# Patient Record
Sex: FEMALE | Race: WHITE
[De-identification: names, ages, dates, MRNs, and addresses within clinical notes are randomized per-mention and may not be internally consistent; named-entity substitution may affect disease eponyms.]

---

## 2018-02-17 ENCOUNTER — HOSPITAL ENCOUNTER (EMERGENCY)
Dept: HOSPITAL 62 - ER | Age: 83
Discharge: ANOTHER HOSPITAL | End: 2018-02-17
Payer: MEDICARE

## 2018-02-17 VITALS — SYSTOLIC BLOOD PRESSURE: 146 MMHG | DIASTOLIC BLOOD PRESSURE: 59 MMHG

## 2018-02-17 DIAGNOSIS — I25.10: ICD-10-CM

## 2018-02-17 DIAGNOSIS — I50.9: ICD-10-CM

## 2018-02-17 DIAGNOSIS — I11.0: ICD-10-CM

## 2018-02-17 DIAGNOSIS — I21.4: Primary | ICD-10-CM

## 2018-02-17 DIAGNOSIS — M54.2: ICD-10-CM

## 2018-02-17 DIAGNOSIS — E78.5: ICD-10-CM

## 2018-02-17 DIAGNOSIS — E78.00: ICD-10-CM

## 2018-02-17 DIAGNOSIS — I25.2: ICD-10-CM

## 2018-02-17 DIAGNOSIS — R07.9: ICD-10-CM

## 2018-02-17 DIAGNOSIS — I48.91: ICD-10-CM

## 2018-02-17 LAB
ADD MANUAL DIFF: NO
ALBUMIN SERPL-MCNC: 4.6 G/DL (ref 3.5–5)
ALP SERPL-CCNC: 48 U/L (ref 38–126)
ALT SERPL-CCNC: 23 U/L (ref 9–52)
ANION GAP SERPL CALC-SCNC: 14 MMOL/L (ref 5–19)
APPEARANCE UR: (no result)
APTT BLD: 23 SEC (ref 23.5–35.8)
APTT PPP: YELLOW S
AST SERPL-CCNC: 21 U/L (ref 14–36)
BASOPHILS # BLD AUTO: 0 10^3/UL (ref 0–0.2)
BASOPHILS NFR BLD AUTO: 0.8 % (ref 0–2)
BILIRUB DIRECT SERPL-MCNC: 0.2 MG/DL (ref 0–0.4)
BILIRUB SERPL-MCNC: 0.6 MG/DL (ref 0.2–1.3)
BILIRUB UR QL STRIP: NEGATIVE
BUN SERPL-MCNC: 12 MG/DL (ref 7–20)
CALCIUM: 10.8 MG/DL (ref 8.4–10.2)
CHLORIDE SERPL-SCNC: 96 MMOL/L (ref 98–107)
CK MB SERPL-MCNC: 1.15 NG/ML (ref ?–4.55)
CK SERPL-CCNC: 50 U/L (ref 30–135)
CO2 SERPL-SCNC: 31 MMOL/L (ref 22–30)
EOSINOPHIL # BLD AUTO: 0.3 10^3/UL (ref 0–0.6)
EOSINOPHIL NFR BLD AUTO: 5.2 % (ref 0–6)
ERYTHROCYTE [DISTWIDTH] IN BLOOD BY AUTOMATED COUNT: 13.4 % (ref 11.5–14)
GLUCOSE SERPL-MCNC: 97 MG/DL (ref 75–110)
GLUCOSE UR STRIP-MCNC: NEGATIVE MG/DL
HCT VFR BLD CALC: 37.8 % (ref 36–47)
HGB BLD-MCNC: 13.1 G/DL (ref 12–15.5)
INR PPP: 0.95
KETONES UR STRIP-MCNC: NEGATIVE MG/DL
LIPASE SERPL-CCNC: 18.4 U/L (ref 23–300)
LYMPHOCYTES # BLD AUTO: 1 10^3/UL (ref 0.5–4.7)
LYMPHOCYTES NFR BLD AUTO: 19.3 % (ref 13–45)
MCH RBC QN AUTO: 29.9 PG (ref 27–33.4)
MCHC RBC AUTO-ENTMCNC: 34.7 G/DL (ref 32–36)
MCV RBC AUTO: 86 FL (ref 80–97)
MONOCYTES # BLD AUTO: 0.7 10^3/UL (ref 0.1–1.4)
MONOCYTES NFR BLD AUTO: 12.6 % (ref 3–13)
NEUTROPHILS # BLD AUTO: 3.4 10^3/UL (ref 1.7–8.2)
NEUTS SEG NFR BLD AUTO: 62.1 % (ref 42–78)
NITRITE UR QL STRIP: NEGATIVE
PH UR STRIP: 7 [PH] (ref 5–9)
PLATELET # BLD: 266 10^3/UL (ref 150–450)
POTASSIUM SERPL-SCNC: 4.2 MMOL/L (ref 3.6–5)
PROT SERPL-MCNC: 6.8 G/DL (ref 6.3–8.2)
PROT UR STRIP-MCNC: NEGATIVE MG/DL
PROTHROMBIN TIME: 13.4 SEC (ref 11.4–15.4)
RBC # BLD AUTO: 4.37 10^6/UL (ref 3.72–5.28)
SODIUM SERPL-SCNC: 141.4 MMOL/L (ref 137–145)
SP GR UR STRIP: 1.01
TOTAL CELLS COUNTED % (AUTO): 100 %
TROPONIN I SERPL-MCNC: 0.24 NG/ML
UROBILINOGEN UR-MCNC: NEGATIVE MG/DL (ref ?–2)
WBC # BLD AUTO: 5.4 10^3/UL (ref 4–10.5)

## 2018-02-17 PROCEDURE — 85730 THROMBOPLASTIN TIME PARTIAL: CPT

## 2018-02-17 PROCEDURE — 71046 X-RAY EXAM CHEST 2 VIEWS: CPT

## 2018-02-17 PROCEDURE — 82553 CREATINE MB FRACTION: CPT

## 2018-02-17 PROCEDURE — 93005 ELECTROCARDIOGRAM TRACING: CPT

## 2018-02-17 PROCEDURE — 83690 ASSAY OF LIPASE: CPT

## 2018-02-17 PROCEDURE — 36415 COLL VENOUS BLD VENIPUNCTURE: CPT

## 2018-02-17 PROCEDURE — 81001 URINALYSIS AUTO W/SCOPE: CPT

## 2018-02-17 PROCEDURE — 85610 PROTHROMBIN TIME: CPT

## 2018-02-17 PROCEDURE — 93010 ELECTROCARDIOGRAM REPORT: CPT

## 2018-02-17 PROCEDURE — 82550 ASSAY OF CK (CPK): CPT

## 2018-02-17 PROCEDURE — 80053 COMPREHEN METABOLIC PANEL: CPT

## 2018-02-17 PROCEDURE — 99291 CRITICAL CARE FIRST HOUR: CPT

## 2018-02-17 PROCEDURE — 84484 ASSAY OF TROPONIN QUANT: CPT

## 2018-02-17 PROCEDURE — 85025 COMPLETE CBC W/AUTO DIFF WBC: CPT

## 2018-02-17 NOTE — RADIOLOGY REPORT (SQ)
EXAM DESCRIPTION:  CHEST PA/LAT



COMPLETED DATE/TIME:  2/17/2018 2:59 pm



REASON FOR STUDY:  CHEST PAIN



COMPARISON:  7/16/2014



NUMBER OF VIEWS:  Two view.



TECHNIQUE:  Frontal and lateral radiographic views of the chest acquired.



LIMITATIONS:  None.



FINDINGS:  LUNGS AND PLEURA: No opacities, masses or pneumothorax. No pleural effusion.

MEDIASTINUM AND HILAR STRUCTURES: No masses.  No contour abnormalities.

HEART AND VASCULAR STRUCTURES: Heart enlarged without failure.  Aorta normal for age.

BONES: No acute findings.

HARDWARE: None in the chest.

OTHER: No other significant finding.



IMPRESSION:  CARDIAC ENLARGEMENT WITHOUT FAILURE.



TECHNICAL DOCUMENTATION:  JOB ID:  [id number]

 2011 [email or web address]- All Rights Reserved

## 2018-02-17 NOTE — ER DOCUMENT REPORT
ED Medical Screen (RME)





- General


Chief Complaint: Chest Pain


Stated Complaint: CHEST PAIN


Time Seen by Provider: 02/17/18 14:21


Mode of Arrival: Ambulatory


Information source: Patient


TRAVEL OUTSIDE OF THE [place] IN LAST 30 DAYS: No





- HPI


Onset: Yesterday


Onset/Duration: Intermittent


Context: 





Patient and daughter describe intense pains that seem to occur randomly, began 

in the interscapular area (more on the left side) and migrate within several 

minutes time to the epigastric area.  The pain is so severe as to be 

incapacitating.  It resolves spontaneously within an hour or so.  It does not 

seem to be correlated with meals.  Patient does have known problems with acid 

reflux and an aortic aneurysm, for which no surgery is planned.  She is taking 

H2 blockers and proton pump inhibitors.  There have been approximately 10 

episodes of this same pain since yesterday.


Quality of pain: Dull, Pressure


Severity: Severe


Associated Symptoms: Nausea, Shortness of breath.  denies: Chills, Fever, 

Vomiting


Exacerbated by: Denies


Relieved by: Denies


Similar symptoms previously: No


Recently seen / treated by doctor: No





- Related Data


Smoking: Non-smoker


Frequency of alcohol use: None


Drug Abuse: None


Allergies/Adverse Reactions: 


 





No Known Allergies Allergy (Verified 02/17/18 11:55)


 











Past Medical History





- General


Information source: Patient





- Social History


Cigarette use (# per day): No


Chew tobacco use (# tins/day): No


Frequency of alcohol use: None


Drug Abuse: None


Lives with: Family


Family history: None





- Past Medical History


Cardiac Medical History: Reports: Hx Atrial Fibrillation, Hx Congestive Heart 

Failure, Hx Coronary Artery Disease, Hx Heart Attack, Hx Hypercholesterolemia, 

Hx Hypertension, Hx Peripheral Vascular Disease


Pulmonary Medical History: Reports: Hx Asthma, Hx Pneumonia


   Denies: Hx Tuberculosis


Neurological Medical History: Denies: Hx Seizures


GI Medical History: Reports: Hx Irritable Bowel, Hx Ulcer


Musculoskeltal Medical History: Reports Hx Arthritis


Past Surgical History: Reports: Hx Bowel Surgery, Hx Cardiac Catheterization - 

3 stent, Hx Coronary Stent, Hx Tonsillectomy.  Denies: Hx Pacemaker





- Immunizations


Hx Diphtheria, Pertussis, Tetanus Vaccination: Yes





Review of Systems





- Review of Systems


Constitutional: Weakness.  denies: Chills, Fever


EENT: No symptoms reported


Cardiovascular: See HPI


Respiratory: Short of breath


Gastrointestinal: See HPI.  denies: Diarrhea, Constipation, Poor appetite, 

Rectal bleeding


Genitourinary: No symptoms reported


Female Genitourinary: Post menopausal


Musculoskeletal: See HPI


Skin: No symptoms reported


Neurological/Psychological: No symptoms reported





Physical Exam





- Vital signs


Vitals: 





 











Temp Pulse Resp BP Pulse Ox


 


 98.4 F   99   16   142/54 H  92 


 


 02/17/18 12:09  02/17/18 12:09  02/17/18 12:09  02/17/18 12:09  02/17/18 12:09











Interpretation: Hypertensive.  No: Tachycardic, Tachypneic, Febrile





- HEENT


Head: Normocephalic


Eyes: Normal


Conjunctiva: Normal


Ears: Normal


External canal: Normal


Nasal: Normal


Mouth/Lips: Normal


Mucous membranes: Normal


Neck: Normal





- Respiratory


Respiratory status: No respiratory distress


Chest status: Nontender


Breath sounds: Normal





- Cardiovascular


Rhythm: Irregularly irregular


Heart sounds: Normal auscultation


Murmur: No





- Abdominal


Inspection: Normal


Distension: No distension


Bowel sounds: Normal





- Back


Back: Normal, Nontender.  No: CVA tenderness





- Extremities


General upper extremity: Normal inspection


General lower extremity: Normal inspection.  No: Edema





- Neurological


Neuro grossly intact: Yes


Cognition: Normal


Orientation: AAOx4





- Psychological


Associated symptoms: Normal affect, Normal mood





- Skin


Skin Temperature: Warm


Skin Moisture: Dry


Skin Color: Normal


Skin Turgor: Elastic





Course





- Vital Signs


Vital signs: 





 











Temp Pulse Resp BP Pulse Ox


 


 98.4 F   99   16   142/54 H  92 


 


 02/17/18 12:09  02/17/18 12:09  02/17/18 12:09  02/17/18 12:09  02/17/18 12:09

## 2018-02-17 NOTE — EKG REPORT
SEVERITY:- ABNORMAL ECG -

SINUS RHYTHM

VENTRICULAR PREMATURE COMPLEX

INCOMPLETE LEFT BUNDLE BRANCH BLOCK

PROBABLE LVH WITH SECONDARY REPOL ABNRM

:

Confirmed by: Latisha Jones 17-Feb-2018 22:08:15

## 2018-02-17 NOTE — ER DOCUMENT REPORT
ED Cardiac





- General


Chief Complaint: Chest Pain


Stated Complaint: CHEST PAIN


Time Seen by Provider: 02/17/18 14:21


Mode of Arrival: Stretcher


Information source: Patient, Relative


TRAVEL OUTSIDE OF THE [place] IN LAST 30 DAYS: No





- HPI


Patient complains to provider of: Chest pain


Notes: 





84-year-old lady with past medical history of hypertension, hyperlipidemia, AAA 

who presented today to emergency department for evaluation of chest pain.  She 

reported that she has been having chest pain for the past 2 days.  Chest pain 

is localized to the middle the chest, described as sharp as well as squeezing, 

radiation of pain to the left shoulder as well as neck, severity of symptoms is 

6 out of 10.  Pain is episodic.





- Related Data


Allergies/Adverse Reactions: 


 





No Known Allergies Allergy (Verified 02/17/18 11:55)


 











Past Medical History





- General


Information source: Patient





- Social History


Smoking Status: Former Smoker


Cigarette use (# per day): No


Chew tobacco use (# tins/day): No


Frequency of alcohol use: None


Drug Abuse: None


Lives with: Family


Family History: Reviewed & Not Pertinent - She has been


Patient has suicidal ideation: No


Patient has homicidal ideation: No





- Past Medical History


Cardiac Medical History: Reports: Hx Atrial Fibrillation, Hx Congestive Heart 

Failure, Hx Coronary Artery Disease, Hx Heart Attack, Hx Hypercholesterolemia, 

Hx Hypertension, Hx Peripheral Vascular Disease


Pulmonary Medical History: Reports: Hx Asthma, Hx Pneumonia


   Denies: Hx Tuberculosis


Neurological Medical History: Denies: Hx Seizures


Renal/ Medical History: Denies: Hx Peritoneal Dialysis


GI Medical History: Reports: Hx Irritable Bowel, Hx Ulcer


Musculoskeltal Medical History: Reports Hx Arthritis


Past Surgical History: Reports: Hx Bowel Surgery, Hx Cardiac Catheterization - 

3 stent, Hx Coronary Stent, Hx Tonsillectomy.  Denies: Hx Pacemaker





- Immunizations


Hx Diphtheria, Pertussis, Tetanus Vaccination: Yes


Hx Pneumococcal Vaccination: 09/30/12





Review of Systems





- Review of Systems


Notes: 





REVIEW OF SYSTEMS:


 CONSTITUTIONAL: -fevers, -chills


 EENT: -eye pain, -difficulty swallowing, -nasal congestion


 CARDIOVASCULAR: +chest pain, -syncope.


 RESPIRATORY: -cough, -SOB


 GASTROINTESTINAL: -abdominal pain, -nausea, -vomiting, -diarrhea, + indigestion


 GENITOURINARY: -dysuria, -hematuria


 MUSCULOSKELETAL: -back pain, -neck pain


 SKIN: -rash or skin lesions.


 HEMATOLOGIC: -easy bruising or bleeding.


 LYMPHATIC: -swollen, enlarged glands.


 NEUROLOGICAL: -altered mental status or loss of consciousness, -headache, -

neurologic symptoms


 PSYCHIATRIC: -anxiety, -depression.


 ALL OTHER SYSTEMS REVIEWED AND NEGATIVE.





Physical Exam





- Vital signs


Vitals: 


 











Temp Pulse Resp BP Pulse Ox


 


 98.4 F   99   16   142/54 H  92 


 


 02/17/18 12:09  02/17/18 12:09  02/17/18 12:09  02/17/18 12:09  02/17/18 12:09














- Notes


Notes: 





Reviewed vital signs and nursing note as charted by RN. 


CONSTITUTIONAL: Alert and oriented and responds appropriately to questions


HEAD: Normocephalic; atraumatic


EYES: PERRL; Conjunctivae clear, sclerae non-icteric


ENT: normal nose; no rhinorrhea; moist mucous membranes; pharynx without 

lesions noted


NECK: Supple without meningismus; non-tender; no cervical lymphadenopathy, no 

masses


CARD: Regular rate and rhythm; no murmurs, no clicks, no rubs, no gallops; 

symmetric distal pulses


RESP: Normal chest excursion without splinting or tachypnea; breath sounds 

clear and equal bilaterally


ABD/GI: Normal bowel sounds; non-distended; soft,


BACK:  The back appears normal and is non-tender to palpation


EXT: Normal ROM in all joints; non-tender to palpation; no cyanosis, no 

effusions, no edema   


SKIN: Normal color for age and race; warm; dry; good turgor; capillary refill < 

2 seconds; no acute lesions noted


NEURO: .Cranial nerves 3-12 intact. Motor strength 5/5 bilaterally. Sensation 

intact to touch bilaterally. No pronator drift. Finger to nose intact 

bilaterally


PSYCH: The patient's mood and manner are appropriate. Grooming and personal 

hygiene are appropriate.








Course





- Re-evaluation


Re-evalutation: 





02/17/18 18:30


84-year-old lady with past medical history of hypertension now with chest pain 

here


Differential diagnoses includes ACS, pneumonia, pleural effusion, GERD, 

muscular skeletal pain


Will obtain basic lab work including CBC, BMP


Chest x-ray, EKG, troponin


Continuous cardiac monitoring as well as pulse oximetry








Reassessment 7 PM


Patient has new ST depressions in her lateral leads compared to her prior EKG


Patient has elevated troponin, suspicion for NSTEMI


Primary Cardiologist is Dr. Reis, who does not have privileges at this hospital


I have discussed the case with transfer center at [facility], agree with 

transfer, will await callback from hospitalist


Will give patient full dose aspirin as well as nitroglycerin paste 


reassess








02/17/18 20:12


Spoke with [facility] transfer center,  will attempt to transfer patient divided


I have also discussed the case with Dr. Villegas from [facility], patient is 

on waiting list at present time, hospitals over the capacity





02/17/18 20:42


I was able to speak with [facility]


Patient is accepted under Dr. Juno Pressley


Agree with IV heparin bolus as well as continuous drip


Transfer patient to [facility]


Family updated, agree with admission and transfer


Discussed results of lab work today as well as workup, agree with transfer, 

discussed risks and benefits of transfer as well


Her pain is improving after nitroglycerin paste as well as full dose aspirin





- Vital Signs


Vital signs: 


 











Temp Pulse Resp BP Pulse Ox


 


 97.8 F   99   18   133/53 H  98 


 


 02/17/18 18:02  02/17/18 12:09  02/17/18 17:31  02/17/18 17:31  02/17/18 17:31














- Laboratory


Result Diagrams: 


 02/17/18 15:11





 02/17/18 15:11


Laboratory results interpreted by me: 


 











  02/17/18





  15:11


 


Chloride  96 L


 


Carbon Dioxide  31 H


 


Est GFR (Non-Af Amer)  58 L


 


Calcium  10.8 H


 


Lipase  18.4 L














Critical Care Note





- Critical Care Note


Total time excluding time spent on procedures (mins): 60


Comments: 





Critical Care Time: 60 minutes


Critical care provider statement: 


Critical care time was exclusive of:  Separately billable procedures and 

treating other patients and teaching time


Critical care was time spent personally by me on the following activities:  

Blood draw for specimens, development of treatment plan with patient or 

surrogate, evaluation of patient's response to treatment, examination of patient

, obtaining history from patient or surrogate, ordering and performing 

treatments and interventions, ordering and review of laboratory studies, 

ordering and review of radiographic studies, pulse oximetry, re-evaluation of 

patient's condition and review of old charts





I assumed direction of critical care for this patient from another provider in 

my specialty: no





Discharge





- Discharge


Referrals: 


MONIKA DAMON MD [Primary Care Provider] - Follow up as needed

## 2018-10-14 ENCOUNTER — HOSPITAL ENCOUNTER (INPATIENT)
Dept: HOSPITAL 62 - ER | Age: 83
LOS: 6 days | Discharge: HOSPICE | DRG: 207 | End: 2018-10-20
Attending: STUDENT IN AN ORGANIZED HEALTH CARE EDUCATION/TRAINING PROGRAM | Admitting: STUDENT IN AN ORGANIZED HEALTH CARE EDUCATION/TRAINING PROGRAM
Payer: MEDICARE

## 2018-10-14 DIAGNOSIS — I42.9: ICD-10-CM

## 2018-10-14 DIAGNOSIS — Z66: ICD-10-CM

## 2018-10-14 DIAGNOSIS — J13: ICD-10-CM

## 2018-10-14 DIAGNOSIS — M19.90: ICD-10-CM

## 2018-10-14 DIAGNOSIS — Z51.5: ICD-10-CM

## 2018-10-14 DIAGNOSIS — K56.7: ICD-10-CM

## 2018-10-14 DIAGNOSIS — I21.4: ICD-10-CM

## 2018-10-14 DIAGNOSIS — Z86.73: ICD-10-CM

## 2018-10-14 DIAGNOSIS — E83.42: ICD-10-CM

## 2018-10-14 DIAGNOSIS — J44.1: ICD-10-CM

## 2018-10-14 DIAGNOSIS — I25.2: ICD-10-CM

## 2018-10-14 DIAGNOSIS — K59.09: ICD-10-CM

## 2018-10-14 DIAGNOSIS — I11.0: ICD-10-CM

## 2018-10-14 DIAGNOSIS — E78.5: ICD-10-CM

## 2018-10-14 DIAGNOSIS — I48.91: ICD-10-CM

## 2018-10-14 DIAGNOSIS — E87.6: ICD-10-CM

## 2018-10-14 DIAGNOSIS — Z79.899: ICD-10-CM

## 2018-10-14 DIAGNOSIS — Z90.49: ICD-10-CM

## 2018-10-14 DIAGNOSIS — J96.02: Primary | ICD-10-CM

## 2018-10-14 DIAGNOSIS — I71.4: ICD-10-CM

## 2018-10-14 DIAGNOSIS — Z95.5: ICD-10-CM

## 2018-10-14 DIAGNOSIS — I50.41: ICD-10-CM

## 2018-10-14 DIAGNOSIS — I73.9: ICD-10-CM

## 2018-10-14 DIAGNOSIS — Z79.52: ICD-10-CM

## 2018-10-14 DIAGNOSIS — J96.01: ICD-10-CM

## 2018-10-14 DIAGNOSIS — G47.33: ICD-10-CM

## 2018-10-14 DIAGNOSIS — I25.10: ICD-10-CM

## 2018-10-14 LAB
ADD MANUAL DIFF: NO
ADD MANUAL DIFF: NO
ALBUMIN SERPL-MCNC: 3.2 G/DL (ref 3.5–5)
ALBUMIN SERPL-MCNC: 4.4 G/DL (ref 3.5–5)
ALP SERPL-CCNC: 40 U/L (ref 38–126)
ALP SERPL-CCNC: 56 U/L (ref 38–126)
ALT SERPL-CCNC: 53 U/L (ref 9–52)
ALT SERPL-CCNC: 60 U/L (ref 9–52)
ANION GAP SERPL CALC-SCNC: 17 MMOL/L (ref 5–19)
ANION GAP SERPL CALC-SCNC: 7 MMOL/L (ref 5–19)
APPEARANCE UR: (no result)
APTT PPP: YELLOW S
ARTERIAL BLOOD FIO2: (no result)
ARTERIAL BLOOD FIO2: (no result)
ARTERIAL BLOOD H2CO3: 1.22 MMOL/L (ref 1.05–1.35)
ARTERIAL BLOOD H2CO3: 1.63 MMOL/L (ref 1.05–1.35)
ARTERIAL BLOOD HCO3: 25 MMOL/L (ref 20–24)
ARTERIAL BLOOD HCO3: 28.5 MMOL/L (ref 20–24)
ARTERIAL BLOOD PCO2: 40.4 MMHG (ref 35–45)
ARTERIAL BLOOD PCO2: 54 MMHG (ref 35–45)
ARTERIAL BLOOD PH: 7.28 (ref 7.35–7.45)
ARTERIAL BLOOD PH: 7.47 (ref 7.35–7.45)
ARTERIAL BLOOD PO2: 72 MMHG (ref 80–100)
ARTERIAL BLOOD PO2: 99.4 MMHG (ref 80–100)
ARTERIAL BLOOD TOTAL CO2: 26.6 MMOL/L (ref 21–25)
ARTERIAL BLOOD TOTAL CO2: 29.7 MMOL/L (ref 21–25)
AST SERPL-CCNC: 54 U/L (ref 14–36)
AST SERPL-CCNC: 57 U/L (ref 14–36)
BASE EXCESS BLDA CALC-SCNC: -2.2 MMOL/L
BASE EXCESS BLDA CALC-SCNC: 4.4 MMOL/L
BASE EXCESS BLDV CALC-SCNC: -7.9 MMOL/L
BASOPHILS # BLD AUTO: 0 10^3/UL (ref 0–0.2)
BASOPHILS # BLD AUTO: 0.1 10^3/UL (ref 0–0.2)
BASOPHILS NFR BLD AUTO: 0.2 % (ref 0–2)
BASOPHILS NFR BLD AUTO: 0.8 % (ref 0–2)
BILIRUB DIRECT SERPL-MCNC: 0.2 MG/DL (ref 0–0.4)
BILIRUB DIRECT SERPL-MCNC: 0.3 MG/DL (ref 0–0.4)
BILIRUB SERPL-MCNC: 0.6 MG/DL (ref 0.2–1.3)
BILIRUB SERPL-MCNC: 0.8 MG/DL (ref 0.2–1.3)
BILIRUB UR QL STRIP: NEGATIVE
BUN SERPL-MCNC: 27 MG/DL (ref 7–20)
BUN SERPL-MCNC: 28 MG/DL (ref 7–20)
CALCIUM: 10.3 MG/DL (ref 8.4–10.2)
CALCIUM: 9 MG/DL (ref 8.4–10.2)
CHLORIDE SERPL-SCNC: 102 MMOL/L (ref 98–107)
CHLORIDE SERPL-SCNC: 95 MMOL/L (ref 98–107)
CK MB SERPL-MCNC: 1.28 NG/ML (ref ?–4.55)
CO2 SERPL-SCNC: 28 MMOL/L (ref 22–30)
CO2 SERPL-SCNC: 29 MMOL/L (ref 22–30)
EOSINOPHIL # BLD AUTO: 0 10^3/UL (ref 0–0.6)
EOSINOPHIL # BLD AUTO: 0.1 10^3/UL (ref 0–0.6)
EOSINOPHIL NFR BLD AUTO: 0.1 % (ref 0–6)
EOSINOPHIL NFR BLD AUTO: 0.7 % (ref 0–6)
ERYTHROCYTE [DISTWIDTH] IN BLOOD BY AUTOMATED COUNT: 13.7 % (ref 11.5–14)
ERYTHROCYTE [DISTWIDTH] IN BLOOD BY AUTOMATED COUNT: 14 % (ref 11.5–14)
GLUCOSE SERPL-MCNC: 225 MG/DL (ref 75–110)
GLUCOSE SERPL-MCNC: 86 MG/DL (ref 75–110)
GLUCOSE UR STRIP-MCNC: NEGATIVE MG/DL
HCO3 BLDV-SCNC: 22.3 MMOL/L (ref 20–32)
HCT VFR BLD CALC: 32.6 % (ref 36–47)
HCT VFR BLD CALC: 39.9 % (ref 36–47)
HGB BLD-MCNC: 11.4 G/DL (ref 12–15.5)
HGB BLD-MCNC: 13.3 G/DL (ref 12–15.5)
INR PPP: 0.96
KETONES UR STRIP-MCNC: NEGATIVE MG/DL
LYMPHOCYTES # BLD AUTO: 0.6 10^3/UL (ref 0.5–4.7)
LYMPHOCYTES # BLD AUTO: 4.4 10^3/UL (ref 0.5–4.7)
LYMPHOCYTES NFR BLD AUTO: 30 % (ref 13–45)
LYMPHOCYTES NFR BLD AUTO: 9.3 % (ref 13–45)
MCH RBC QN AUTO: 30.3 PG (ref 27–33.4)
MCH RBC QN AUTO: 30.6 PG (ref 27–33.4)
MCHC RBC AUTO-ENTMCNC: 33.4 G/DL (ref 32–36)
MCHC RBC AUTO-ENTMCNC: 34.9 G/DL (ref 32–36)
MCV RBC AUTO: 88 FL (ref 80–97)
MCV RBC AUTO: 91 FL (ref 80–97)
MONOCYTES # BLD AUTO: 0.7 10^3/UL (ref 0.1–1.4)
MONOCYTES # BLD AUTO: 1.2 10^3/UL (ref 0.1–1.4)
MONOCYTES NFR BLD AUTO: 11.7 % (ref 3–13)
MONOCYTES NFR BLD AUTO: 8.2 % (ref 3–13)
NEUTROPHILS # BLD AUTO: 4.8 10^3/UL (ref 1.7–8.2)
NEUTROPHILS # BLD AUTO: 8.8 10^3/UL (ref 1.7–8.2)
NEUTS SEG NFR BLD AUTO: 60.3 % (ref 42–78)
NEUTS SEG NFR BLD AUTO: 78.7 % (ref 42–78)
NITRITE UR QL STRIP: NEGATIVE
PCO2 BLDV: 75.8 MMHG (ref 35–63)
PH BLDV: 7.09 [PH] (ref 7.3–7.42)
PH UR STRIP: 6 [PH] (ref 5–9)
PHOSPHATE SERPL-MCNC: 3.3 MG/DL (ref 2.5–4.5)
PLATELET # BLD: 167 10^3/UL (ref 150–450)
PLATELET # BLD: 267 10^3/UL (ref 150–450)
POTASSIUM SERPL-SCNC: 3.6 MMOL/L (ref 3.6–5)
POTASSIUM SERPL-SCNC: 3.6 MMOL/L (ref 3.6–5)
PROT SERPL-MCNC: 5.5 G/DL (ref 6.3–8.2)
PROT SERPL-MCNC: 7.2 G/DL (ref 6.3–8.2)
PROT UR STRIP-MCNC: 100 MG/DL
PROTHROMBIN TIME: 13.3 SEC (ref 11.4–15.4)
RBC # BLD AUTO: 3.72 10^6/UL (ref 3.72–5.28)
RBC # BLD AUTO: 4.39 10^6/UL (ref 3.72–5.28)
SAO2 % BLDA: 92.4 % (ref 94–98)
SAO2 % BLDA: 97.8 % (ref 94–98)
SODIUM SERPL-SCNC: 138.2 MMOL/L (ref 137–145)
SODIUM SERPL-SCNC: 140.3 MMOL/L (ref 137–145)
SP GR UR STRIP: 1.01
TOTAL CELLS COUNTED % (AUTO): 100 %
TOTAL CELLS COUNTED % (AUTO): 100 %
TROPONIN I SERPL-MCNC: 0.02 NG/ML
UROBILINOGEN UR-MCNC: NEGATIVE MG/DL (ref ?–2)
WBC # BLD AUTO: 14.7 10^3/UL (ref 4–10.5)
WBC # BLD AUTO: 6.1 10^3/UL (ref 4–10.5)

## 2018-10-14 PROCEDURE — 94660 CPAP INITIATION&MGMT: CPT

## 2018-10-14 PROCEDURE — 74177 CT ABD & PELVIS W/CONTRAST: CPT

## 2018-10-14 PROCEDURE — 87205 SMEAR GRAM STAIN: CPT

## 2018-10-14 PROCEDURE — 85610 PROTHROMBIN TIME: CPT

## 2018-10-14 PROCEDURE — 93005 ELECTROCARDIOGRAM TRACING: CPT

## 2018-10-14 PROCEDURE — 96366 THER/PROPH/DIAG IV INF ADDON: CPT

## 2018-10-14 PROCEDURE — 76604 US EXAM CHEST: CPT

## 2018-10-14 PROCEDURE — 87040 BLOOD CULTURE FOR BACTERIA: CPT

## 2018-10-14 PROCEDURE — 70450 CT HEAD/BRAIN W/O DYE: CPT

## 2018-10-14 PROCEDURE — 74018 RADEX ABDOMEN 1 VIEW: CPT

## 2018-10-14 PROCEDURE — 84484 ASSAY OF TROPONIN QUANT: CPT

## 2018-10-14 PROCEDURE — 83605 ASSAY OF LACTIC ACID: CPT

## 2018-10-14 PROCEDURE — 87086 URINE CULTURE/COLONY COUNT: CPT

## 2018-10-14 PROCEDURE — 80053 COMPREHEN METABOLIC PANEL: CPT

## 2018-10-14 PROCEDURE — C1751 CATH, INF, PER/CENT/MIDLINE: HCPCS

## 2018-10-14 PROCEDURE — 74019 RADEX ABDOMEN 2 VIEWS: CPT

## 2018-10-14 PROCEDURE — 71045 X-RAY EXAM CHEST 1 VIEW: CPT

## 2018-10-14 PROCEDURE — 87493 C DIFF AMPLIFIED PROBE: CPT

## 2018-10-14 PROCEDURE — 99285 EMERGENCY DEPT VISIT HI MDM: CPT

## 2018-10-14 PROCEDURE — S0164 INJECTION PANTROPRAZOLE: HCPCS

## 2018-10-14 PROCEDURE — 74176 CT ABD & PELVIS W/O CONTRAST: CPT

## 2018-10-14 PROCEDURE — 0BH17EZ INSERTION OF ENDOTRACHEAL AIRWAY INTO TRACHEA, VIA NATURAL OR ARTIFICIAL OPENING: ICD-10-PCS

## 2018-10-14 PROCEDURE — 93306 TTE W/DOPPLER COMPLETE: CPT

## 2018-10-14 PROCEDURE — 85730 THROMBOPLASTIN TIME PARTIAL: CPT

## 2018-10-14 PROCEDURE — 87070 CULTURE OTHR SPECIMN AEROBIC: CPT

## 2018-10-14 PROCEDURE — 80048 BASIC METABOLIC PNL TOTAL CA: CPT

## 2018-10-14 PROCEDURE — 87088 URINE BACTERIA CULTURE: CPT

## 2018-10-14 PROCEDURE — 82803 BLOOD GASES ANY COMBINATION: CPT

## 2018-10-14 PROCEDURE — 81001 URINALYSIS AUTO W/SCOPE: CPT

## 2018-10-14 PROCEDURE — 87186 SC STD MICRODIL/AGAR DIL: CPT

## 2018-10-14 PROCEDURE — 83880 ASSAY OF NATRIURETIC PEPTIDE: CPT

## 2018-10-14 PROCEDURE — 02HV33Z INSERTION OF INFUSION DEVICE INTO SUPERIOR VENA CAVA, PERCUTANEOUS APPROACH: ICD-10-PCS | Performed by: SURGERY

## 2018-10-14 PROCEDURE — 94002 VENT MGMT INPAT INIT DAY: CPT

## 2018-10-14 PROCEDURE — 94003 VENT MGMT INPAT SUBQ DAY: CPT

## 2018-10-14 PROCEDURE — 96375 TX/PRO/DX INJ NEW DRUG ADDON: CPT

## 2018-10-14 PROCEDURE — 96376 TX/PRO/DX INJ SAME DRUG ADON: CPT

## 2018-10-14 PROCEDURE — 82962 GLUCOSE BLOOD TEST: CPT

## 2018-10-14 PROCEDURE — 83735 ASSAY OF MAGNESIUM: CPT

## 2018-10-14 PROCEDURE — 5A1955Z RESPIRATORY VENTILATION, GREATER THAN 96 CONSECUTIVE HOURS: ICD-10-PCS | Performed by: INTERNAL MEDICINE

## 2018-10-14 PROCEDURE — 0D9670Z DRAINAGE OF STOMACH WITH DRAINAGE DEVICE, VIA NATURAL OR ARTIFICIAL OPENING: ICD-10-PCS

## 2018-10-14 PROCEDURE — 82550 ASSAY OF CK (CPK): CPT

## 2018-10-14 PROCEDURE — B548ZZA ULTRASONOGRAPHY OF SUPERIOR VENA CAVA, GUIDANCE: ICD-10-PCS | Performed by: SURGERY

## 2018-10-14 PROCEDURE — 71260 CT THORAX DX C+: CPT

## 2018-10-14 PROCEDURE — 36415 COLL VENOUS BLD VENIPUNCTURE: CPT

## 2018-10-14 PROCEDURE — 96365 THER/PROPH/DIAG IV INF INIT: CPT

## 2018-10-14 PROCEDURE — 87077 CULTURE AEROBIC IDENTIFY: CPT

## 2018-10-14 PROCEDURE — 85025 COMPLETE CBC W/AUTO DIFF WBC: CPT

## 2018-10-14 PROCEDURE — 93010 ELECTROCARDIOGRAM REPORT: CPT

## 2018-10-14 PROCEDURE — 84100 ASSAY OF PHOSPHORUS: CPT

## 2018-10-14 PROCEDURE — 82553 CREATINE MB FRACTION: CPT

## 2018-10-14 PROCEDURE — 83615 LACTATE (LD) (LDH) ENZYME: CPT

## 2018-10-14 RX ADMIN — Medication SCH: at 14:13

## 2018-10-14 RX ADMIN — MIDAZOLAM HYDROCHLORIDE PRN MLS/HR: 5 INJECTION, SOLUTION INTRAMUSCULAR; INTRAVENOUS at 22:28

## 2018-10-14 RX ADMIN — METHYLPREDNISOLONE SODIUM SUCCINATE SCH MG: 40 INJECTION, POWDER, FOR SOLUTION INTRAMUSCULAR; INTRAVENOUS at 21:54

## 2018-10-14 RX ADMIN — HEPARIN SODIUM SCH UNIT: 5000 INJECTION, SOLUTION INTRAVENOUS; SUBCUTANEOUS at 21:53

## 2018-10-14 RX ADMIN — IPRATROPIUM BROMIDE AND ALBUTEROL SULFATE SCH ML: 2.5; .5 SOLUTION RESPIRATORY (INHALATION) at 14:07

## 2018-10-14 RX ADMIN — Medication PRN MLS/HR: at 10:06

## 2018-10-14 RX ADMIN — IPRATROPIUM BROMIDE AND ALBUTEROL SULFATE SCH ML: 2.5; .5 SOLUTION RESPIRATORY (INHALATION) at 20:56

## 2018-10-14 RX ADMIN — Medication SCH ML: at 21:53

## 2018-10-14 RX ADMIN — SODIUM CHLORIDE PRN MLS/HR: 9 INJECTION, SOLUTION INTRAVENOUS at 19:20

## 2018-10-14 RX ADMIN — Medication PRN MLS/HR: at 22:06

## 2018-10-14 RX ADMIN — Medication PRN MLS/HR: at 21:46

## 2018-10-14 RX ADMIN — Medication SCH UNIT: at 21:53

## 2018-10-14 NOTE — RADIOLOGY REPORT (SQ)
EXAM DESCRIPTION:  CT HEAD WITHOUT



COMPLETED DATE/TIME:  10/14/2018 11:20 am



REASON FOR STUDY:  AMS



COMPARISON:  None.



TECHNIQUE:  Axial images acquired through the brain without intravenous contrast.  Images reviewed wi
th bone, brain and subdural windows.  Additional sagittal and coronal reconstructions were generated.
 Images stored on PACS.

All CT scanners at this facility use dose modulation, iterative reconstruction, and/or weight based d
osing when appropriate to reduce radiation dose to as low as reasonably achievable (ALARA).

[facility]: Dose Right  [facility]: CareDose    [facility]: Dose Right    [facility]: Teradose 4D    [facility]: Smart Technologies



RADIATION DOSE:  CT Rad equipment meets quality standard of care and radiation dose reduction techniq
ues were employed. CTDIvol: 53.2 mGy. DLP: 911 mGy-cm.mGy.



LIMITATIONS:  None.



FINDINGS:  VENTRICLES: Prominent.

CEREBRUM: No masses.  No hemorrhage.  No midline shift.  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  Areas o
f low density in the white matter most likely due to chronic micro-vascular ischemic change.  No evid
ence for acute infarction.

CEREBELLUM: No masses.  No hemorrhage.  No alteration of density.  No evidence for acute infarction.

EXTRAAXIAL SPACES: Age-related involutional change.  No fluid collections.  No masses.

ORBITS AND GLOBE: No intra- or extraconal masses.  Normal contour of globe without masses.

CALVARIUM: No fracture.

PARANASAL SINUSES: No fluid or mucosal thickening.

SOFT TISSUES: No mass or hematoma.

OTHER: No other significant finding.



IMPRESSION:  CHRONIC CHANGES OF ATROPHY AND MICROVASCULAR ISCHEMIA.  NO ACUTE PROCESS.

EVIDENCE OF ACUTE STROKE: NO.



TECHNICAL DOCUMENTATION:  JOB ID:  [id number]

Quality ID # 436: Final reports with documentation of one or more dose reduction techniques (e.g., Au
tomated exposure control, adjustment of the mA and/or kV according to patient size, use of iterative 
reconstruction technique)

 2011 [employer]- All Rights Reserved



Reading location - IP/workstation name: [email or web address]

## 2018-10-14 NOTE — XCELERA REPORT
[address] [address]

                               Tel: [phone]

                               Fax: [phone]



                      Transthoracic Echocardiogram Report

_______________________________________________________________________________



Name: REUBEN SALINAS

MRN: [id number]                           Age: 85 yrs

Gender: Female                            : 1933

Patient Status: Inpatient                 Patient Location: ICU^606^A

Account #: [id number]

Study Date: 10/14/2018 03:09 PM

Accession #: [id number]

_______________________________________________________________________________



Height: 62 in        Weight: 111 lb        BSA: 1.5 m2

_______________________________________________________________________________

Procedure: A complete two-dimensional transthoracic echocardiogram was

performed (2D, M-mode, spectral and color flow Doppler). The study was

technically difficult with many images being suboptimal in quality.

Reason For Study: new CHF





Ordering Physician: ALANNAH ESCAMILLA

Performed By: Hellen Hudson



_______________________________________________________________________________



Interpretation Summary

The Ejection Fraction estimate is 30-35%

Left ventricular systolic function is moderate to severely reduced.

There is mild to moderate concentric left ventricular hypertrophy.

The left ventricle is grossly normal size.

LV diastolic function could not be adequately assessed.

Regional wall motion abnormalities cannot be excluded due to limited

visualization.

The right ventricle is grossly normal size.

The right ventricular systolic function is normal.

The left atrial size is normal.

The right atrium is normal in size

There is a mild amount of mitral regurgitation

There is no mitral valve stenosis.

There is a moderate amount of aortic regurgitation

There is no aortic valve stenosis

There is a trace or physiologic amount of tricuspid regurgitation

Tricuspid regurgitation jet envelope not well defined to measure RV systolic

pressure accurately.

The aortic root is not well visualized but is probably normal size.

The inferior vena cava appeared normal and decreased < 50% with respiration

(RAP 10-15 mmHg)

Minimal pericardial effusion.



MMode/2D Measurements & Calculations

RVDd: 2.1 cm  LVIDd: 5.0 cm   FS: 16.2 %             Ao root diam: 3.5 cm

IVSd: 1.2 cm  LVIDs: 4.2 cm   EDV(Teich): 117.0 ml

                                                     Ao root area: 9.9 cm2

              LVPWd: 1.1 cm   ESV(Teich): 77.2 ml    LA dimension: 3.1 cm

                              EF(Teich): 34.0 %



Doppler Measurements & Calculations

MV E max ana:      MV P1/2t max ana:     Ao V2 max:       AI max ana:

89.8 cm/sec        103.8 cm/sec          119.4 cm/sec     316.5 cm/sec

                   MV P1/2t: 60.2 msec   Ao max PG:       AI max P.6 mmHg

                   MVA(P1/2t): 3.7 cm2   5.7 mmHg         AI dec slope:

                   MV dec slope:                          171.5 cm/sec2

                                                          AI P1/2t: 540.3 msec

                   505.1 cm/sec2

                   MV dec time: 0.19 sec

        _______________________________________________________________

LV V1 max PG:      TV V2 max:            PA V2 max:       TR max ana:

2.3 mmHg           71.8 cm/sec           82.4 cm/sec      229.4 cm/sec

LV V1 max:         TV max P.1 mmHg   PA max PG:       TR max P.0 mmHg

75.5 cm/sec                              2.7 mmHg



        _______________________________________________________________

AV P1/2t-pr_phl:   MV P1/2t-pr_phl:

552.3 msec         60.2 msec



Left Ventricle

The left ventricle is grossly normal size. There is mild to moderate

concentric left ventricular hypertrophy. Left ventricular systolic function is

moderate to severely reduced. The Ejection Fraction estimate is 30-35%. LV

diastolic function could not be adequately assessed. Regional wall motion

abnormalities cannot be excluded due to limited visualization.



Right Ventricle

The right ventricle is grossly normal size. There is normal right ventricular

wall thickness. The right ventricular systolic function is normal.





Atria

The right atrium is normal in size. The left atrial size is normal.



Mitral Valve

The mitral valve leaflets are sclerotic, but show no functional abnormalities.

There is no mitral valve stenosis. There is a mild amount of mitral

regurgitation.



Aortic Valve

The aortic valve is not well visualized secondary to technical limitations.

There is no aortic valve stenosis. There is a moderate amount of aortic

regurgitation.



Tricuspid Valve

The tricuspid valve is not well visualized secondary to technical limitations.

There is no tricuspid stenosis. There is a trace or physiologic amount of

tricuspid regurgitation. Tricuspid regurgitation jet envelope not well defined

to measure RV systolic pressure accurately.



Pulmonic Valve

The pulmonic valve is not well visualized.





Great Vessels

The aortic root is not well visualized but is probably normal size. The

inferior vena cava appeared normal and decreased < 50% with respiration (RAP

10-15 mmHg).



Effusions

Minimal pericardial effusion.



_______________________________________________________________________________



_______________________________________________________________________________

Electronically signed by:      Latisha oJnes      on 10/14/2018 07:40 PM



CC: ALANNAH ESCAMILLA

>

Latisha Jones

## 2018-10-14 NOTE — RADIOLOGY REPORT (SQ)
EXAM DESCRIPTION:  CT ABD/PELVIS WITH IV ONLY



COMPLETED DATE/TIME:  10/14/2018 11:20 am



REASON FOR STUDY:  Abdominal distension



COMPARISON:  None.



TECHNIQUE:  CT scan of the abdomen and pelvis performed using helical scanning technique with dynamic
 intravenous contrast injection.  No oral contrast. Images reviewed with lung, soft tissue, and bone 
windows. Reconstructed coronal and sagittal MPR images reviewed. Delayed images for evaluation of the
 urinary system also acquired. All images stored on PACS.

All CT scanners at this facility use dose modulation, iterative reconstruction, and/or weight based d
osing when appropriate to reduce radiation dose to as low as reasonably achievable (ALARA).

[facility]: Dose Right  [facility]: CareDose    [facility]: Dose Right    [facility]: Teradose 4D    [facility]: Smart Technologies



CONTRAST TYPE AND DOSE:  contrast/concentration: Isovue 300.00 mg/ml; Total Contrast Delivered: 65.0 
ml; Total Saline Delivered: 65.0 ml



RENAL FUNCTION:  BUN 28 creatinine 1.3



RADIATION DOSE:  .



LIMITATIONS:  None.



FINDINGS:  LOWER CHEST: See separate report of the CT of the chest.

LIVER: Normal size. No masses.  No dilated ducts.

SPLEEN: Normal size. No focal lesions.

PANCREAS: No masses. No significant calcifications. No adjacent inflammation or peripancreatic fluid 
collections. Pancreatic duct not dilated.

GALLBLADDER: No identified stones by CT criteria. No inflammatory changes to suggest cholecystitis.

ADRENAL GLANDS: No significant masses or asymmetry.

RIGHT KIDNEY AND URETER: No solid masses.   No significant calcifications.   No hydronephrosis or hyd
roureter.

LEFT KIDNEY AND URETER: No solid masses.   No significant calcifications.   No hydronephrosis or hydr
oureter.

AORTA AND VESSELS: No aneurysm.

RETROPERITONEUM: No retroperitoneal adenopathy, hemorrhage or masses.

BOWEL AND PERITONEAL CAVITY: Nasogastric tube in the stomach.  No evidence of abscess or bowel obstru
ction.  Anastomosis sigmoid colon.  No ascites.

APPENDIX: Not visualized.

PELVIS: Schwartz catheter in urinary bladder.

ABDOMINAL WALL: No masses. No hernias.

BONES: No acute findings.

OTHER: No other significant finding.



IMPRESSION:  No acute findings in the abdomen or pelvis.



TECHNICAL DOCUMENTATION:  JOB ID:  [id number]

Quality ID # 436: Final reports with documentation of one or more dose reduction techniques (e.g., Au
tomated exposure control, adjustment of the mA and/or kV according to patient size, use of iterative 
reconstruction technique)

 2011 [employer]- All Rights Reserved



Reading location - IP/workstation name: [email or web address]

## 2018-10-14 NOTE — RADIOLOGY REPORT (SQ)
EXAM DESCRIPTION:  CHEST SINGLE VIEW



COMPLETED DATE/TIME:  10/14/2018 9:49 am



REASON FOR STUDY:  Intubation/OG Placement Verification



COMPARISON:  2/17/2018



NUMBER OF VIEWS:  One view.



TECHNIQUE:  Single frontal radiographic image of the chest acquired.



LIMITATIONS:  None.



FINDINGS:  LUNGS AND PLEURA: COPD.  Basilar increased interstitial pattern.  No pneumothorax.

MEDIASTINUM AND HILAR STRUCTURES: Stable heart size and mediastinal structures.

HEART AND VASCULAR STRUCTURES: Stable appearance.

SUPPORT DEVICES: Endotracheal tube tip between thoracic inlet and arash.  Nasogastric tube extending
 into the left upper quadrant.

BONES: No acute findings.

OTHER: No other significant finding.



IMPRESSION:  COPD with suspected superimposed edema or pneumonia status post intubation.  Good positi
on of support apparatus.



TECHNICAL DOCUMENTATION:  JOB ID:  [id number]

 2011 [employer]- All Rights Reserved



Reading location - IP/workstation name: [email or web address]

## 2018-10-14 NOTE — OPERATIVE REPORT
Operative Report


DATE OF SURGERY: 10/14/18


PREOPERATIVE DIAGNOSIS: Ventilatory dependency secondary to acute respiratory 

failure


POSTOPERATIVE DIAGNOSIS: Same


OPERATION: 1.  Focused ultrasound of the left neck.  2.  Ultrasound directed 

insertion of triple-lumen central venous access catheter ultrasound-guided left 

neck


SURGEON: MADALYN DUONG


ANESTHESIA: Local


TISSUE REMOVED OR ALTERED: None


COMPLICATIONS: 





None


ESTIMATED BLOOD LOSS: 5 cc


INTRAOPERATIVE FINDINGS: See below


PROCEDURE: 


Informed consent was obtained.


 


The patient was placed in Trendelenburg the left  neck and chest wall were 

exposed, and prepped and draped in a sterile fashion.  Surgical plan and 

surgical timeout discussed.


 


The left  neck was anesthetized with 1% lidocaine without epinephrine.  Using 

the variable frequency linear transducer, real time, a 18-gauge needle and wire 

were threaded into the left  internal jugular vein.  The tract was dilated up, 

the dilator removed, and the triple-lumen central venous access catheter was 

threaded into the right internal jugular vein uneventfully to the hub.  There 

was excellent aspiration and flush of saline through all 3 lumens.  The 

catheter was affixed to the skin with a Biopatch and 2-0 silk suture; sterile 

dressing applied.


 


The patient tolerated the procedure well.  There were no complications.  

Portable upright chest x-ray pending at time of dictation.

## 2018-10-14 NOTE — ER DOCUMENT REPORT
ED Respiratory Problem





- General


Chief Complaint: Respiratory Distress


Stated Complaint: DIFFICULTY BREATHING


Time Seen by Provider: 10/14/18 09:20


Mode of Arrival: Stretcher


Information source: Emergency Med Personnel


Cannot obtain history due to: Altered mental status


Notes: 





Patient presented with altered mental status and shortness of breath which 

started this morning by EMS.  Patient was intubated for airway protection.


TRAVEL OUTSIDE OF THE [place] IN LAST 30 DAYS: No





- HPI


Patient complains to provider of: COPD, Short of breath


Onset: Just prior to arrival


Duration: Continuous


Quality of pain: No pain


Severity: Severe


Pain Level: 5 - Patient unable to give medical history.


EMS treatments: Bronchodilators, CPAP


Associated symptoms: None


Similar symptoms previously: No


Recently seen / treated by doctor: No





- Related Data


Allergies/Adverse Reactions: 


 





No Known Allergies Allergy (Verified 02/17/18 11:55)


 











Past Medical History





- Social History


Smoking Status: Unknown if Ever Smoked


Family History: Reviewed & Not Pertinent - She has been





- Past Medical History


Cardiac Medical History: Reports: Hx Atrial Fibrillation, Hx Congestive Heart 

Failure, Hx Coronary Artery Disease, Hx Heart Attack, Hx Hypercholesterolemia, 

Hx Hypertension, Hx Peripheral Vascular Disease


Pulmonary Medical History: Reports: Hx Asthma, Hx Pneumonia


   Denies: Hx Tuberculosis


Neurological Medical History: Denies: Hx Seizures


Renal/ Medical History: Denies: Hx Peritoneal Dialysis


GI Medical History: Reports: Hx Irritable Bowel, Hx Ulcer


Musculoskeletal Medical History: Reports Hx Arthritis


Past Surgical History: Reports: Hx Bowel Surgery, Hx Cardiac Catheterization - 

3 stent, Hx Coronary Stent, Hx Tonsillectomy.  Denies: Hx Pacemaker





- Immunizations


Hx Diphtheria, Pertussis, Tetanus Vaccination: Yes


Hx Pneumococcal Vaccination: 09/30/12





Review of Systems





- Review of Systems


-: Yes ROS unobtainable due to patient's medical condition - Patient is 

nonresponsive.





Physical Exam





- Vital signs


Vitals: 


 











Resp BP


 


 19   150/78 H


 


 10/14/18 09:22  10/14/18 09:22














- General


General appearance: Unresponsive


In distress: Severe





- HEENT


Head: Normocephalic


Eyes: Normal


Conjunctiva: Normal


Cornea: Normal





- Respiratory


Respiratory status: Respiratory distress, Depressed respirations


Chest status: Nontender


Breath sounds: Decreased air movement


Chest palpation: Normal





- Cardiovascular


Rhythm: Tachycardia





- Abdominal


Distension: Distended


Bowel sounds: Normal


Tenderness: Nontender


Organomegaly: No organomegaly





- Back


Back: Normal, Nontender





- Extremities


General upper extremity: Normal inspection, Nontender, Normal color, Normal ROM

, Normal temperature


General lower extremity: Normal inspection, Nontender, Normal color, Normal ROM

, Normal temperature, Normal weight bearing.  No: Eliot's sign





- Neurological


Neuro grossly intact: Yes


Cognition: Normal


Orientation: AAOx4


Marilynn Coma Scale Eye Opening: Spontaneous


[place] Coma Scale Verbal: Oriented


[place] Coma Scale Motor: Obeys Commands


[place] Coma Scale Total: 15


Speech: Normal


Motor strength normal: LUE, RUE, LLE, RLE


Sensory: Normal





- Skin


Skin Temperature: Warm


Skin Moisture: Dry


Skin Color: Normal





Course





- Vital Signs


Vital signs: 


 











Temp Pulse Resp BP Pulse Ox


 


 98.7 F   89   17   119/60   100 


 


 10/14/18 14:30  10/14/18 14:07  10/14/18 16:04  10/14/18 15:45  10/14/18 16:19














- Laboratory


Result Diagrams: 


 10/14/18 09:24





 10/14/18 09:24


Laboratory results interpreted by me: 


 











  10/14/18 10/14/18 10/14/18





  09:24 09:24 09:24


 


WBC  14.7 H  


 


Absolute Neutrophils  8.8 H  


 


APTT   


 


Carbonic Acid   


 


ABG pH   


 


ABG pCO2   


 


ABG pO2   


 


ABG HCO3   


 


ABG Total CO2   


 


ABG O2 Saturation   


 


VBG pH   


 


VBG pCO2   


 


Chloride   95 L 


 


BUN   28 H 


 


Creatinine   1.29 H 


 


Est GFR ( Amer)   48 L 


 


Est GFR (Non-Af Amer)   39 L 


 


Glucose   225 H 


 


POC Glucose   


 


Lactic Acid    6.6 H


 


Calcium   10.3 H 


 


AST   57 H 


 


ALT   53 H 


 


Creatine Kinase   


 


NT-Pro-B Natriuret Pep   


 


Urine Protein   


 


Urine Ascorbic Acid   














  10/14/18 10/14/18 10/14/18





  09:24 09:24 09:24


 


WBC   


 


Absolute Neutrophils   


 


APTT   


 


Carbonic Acid   


 


ABG pH   


 


ABG pCO2   


 


ABG pO2   


 


ABG HCO3   


 


ABG Total CO2   


 


ABG O2 Saturation   


 


VBG pH  7.09 L*  


 


VBG pCO2  75.8 H*  


 


Chloride   


 


BUN   


 


Creatinine   


 


Est GFR ( Amer)   


 


Est GFR (Non-Af Amer)   


 


Glucose   


 


POC Glucose   


 


Lactic Acid   


 


Calcium   


 


AST   


 


ALT   


 


Creatine Kinase   28 L 


 


NT-Pro-B Natriuret Pep    9440 H


 


Urine Protein   


 


Urine Ascorbic Acid   














  10/14/18 10/14/18 10/14/18





  09:24 09:42 10:04


 


WBC   


 


Absolute Neutrophils   


 


APTT  22.4 L  


 


Carbonic Acid   


 


ABG pH   


 


ABG pCO2   


 


ABG pO2   


 


ABG HCO3   


 


ABG Total CO2   


 


ABG O2 Saturation   


 


VBG pH   


 


VBG pCO2   


 


Chloride   


 


BUN   


 


Creatinine   


 


Est GFR ( Amer)   


 


Est GFR (Non-Af Amer)   


 


Glucose   


 


POC Glucose    318 H


 


Lactic Acid   


 


Calcium   


 


AST   


 


ALT   


 


Creatine Kinase   


 


NT-Pro-B Natriuret Pep   


 


Urine Protein   100 H 


 


Urine Ascorbic Acid   20 H 














  10/14/18





  10:30


 


WBC 


 


Absolute Neutrophils 


 


APTT 


 


Carbonic Acid  1.63 H


 


ABG pH  7.28 L


 


ABG pCO2  54.0 H


 


ABG pO2  72.0 L


 


ABG HCO3  25.0 H


 


ABG Total CO2  26.6 H


 


ABG O2 Saturation  92.4 L


 


VBG pH 


 


VBG pCO2 


 


Chloride 


 


BUN 


 


Creatinine 


 


Est GFR ( Amer) 


 


Est GFR (Non-Af Amer) 


 


Glucose 


 


POC Glucose 


 


Lactic Acid 


 


Calcium 


 


AST 


 


ALT 


 


Creatine Kinase 


 


NT-Pro-B Natriuret Pep 


 


Urine Protein 


 


Urine Ascorbic Acid 














- Diagnostic Test


Radiology reviewed: Image reviewed, Reports reviewed





- EKG Interpretation by Me


Rate: Tachycardia - 141


Rhythm: A.Fib - with RVR


Axis/QRS: LBBB


When compared to previous EKG there are: Previous EKG unavailable





- Transfer of Care


Notes: 





10/14/18 16:52


I consulted the hospitalist on-call Dr. Mancilla.  He will admit patient for 

further evaluation and management.





Procedures





- Intubation


  ** Orotracheal


Time of Intubation: 09:25


Airway evaluation: Normal anatomy


Medications: Etomidate, Other - Rocuronium


Intubation method: Orotracheal


Blade type: [place]


Blade size: 3


Equipment used: Glidescope


ETT size: 7.0


ETT secured at: Lips


ETT secured at (cm): 22


Breath Sounds after Intubation: Equal


End tidal CO2 confirmed: Yes


Ventilator settings: SIMV


Post Intubation Xray: Yes


Intubation Complications: No complications





Critical Care Note





- Critical Care Note


Total time excluding time spent on procedures (mins): 55





Discharge





- Discharge


Clinical Impression: 


 COPD exacerbation





Pneumonia


Qualifiers:


 Pneumonia type: due to unspecified organism Laterality: unspecified laterality 

Lung location: unspecified part of lung Qualified Code(s): J18.9 - Pneumonia, 

unspecified organism





Respiratory failure


Qualifiers:


 Chronicity: acute Respiratory failure complication: hypercapnia Qualified Code(

s): J96.02 - Acute respiratory failure with hypercapnia





CHF (congestive heart failure)


Qualifiers:


 Heart failure type: unspecified Heart failure chronicity: acute Qualified Code(

s): I50.9 - Heart failure, unspecified





Condition: Critical


Disposition: ADMITTED AS INPATIENT


Admitting Provider: Dr Skyler Mancilla


Unit Admitted: ICU

## 2018-10-14 NOTE — EKG REPORT
SEVERITY:- ABNORMAL ECG -

ATRIAL FIBRILLATION, V-RATE 

PAIRED VENTRICULAR PREMATURE COMPLEXES

NONSPECIFIC INTRAVENTRICULAR CONDUCTION DELAY

PROBABLE ANTERIOR INFARCT, ACUTE

:

Confirmed by: Kristin Mayo MD 14-Oct-2018 19:46:39

## 2018-10-14 NOTE — RADIOLOGY REPORT (SQ)
EXAM DESCRIPTION:  CT CHEST WITH



COMPLETED DATE/TIME:  10/14/2018 11:20 am



REASON FOR STUDY:  SOB



COMPARISON:  12/15/2015



TECHNIQUE:  CT scan of the chest performed using helical scanning technique with dynamic intravenous 
contrast injection.  Images reviewed with lung, soft tissue and bone windows.  Reconstructed coronal 
and sagittal MPR and MIP images reviewed.  All images stored on PACS.

All CT scanners at this facility use dose modulation, iterative reconstruction, and/or weight based d
osing when appropriate to reduce radiation dose to as low as reasonably achievable (ALARA).

[facility]: Dose Right  [facility]: CareDose    [facility]: Dose Right    [facility]: Teradose 4D    [facility]: Smart Technologies



CONTRAST TYPE AND DOSE:  See separate report of the same date.



RENAL FUNCTION:  See separate report of the same date.



RADIATION DOSE:  CT Rad equipment meets quality standard of care and radiation dose reduction techniq
ues were employed. CTDIvol: 9.6 - 14.4 mGy. DLP: 1338 mGy-cm. .



LIMITATIONS:  None.



FINDINGS:  LUNGS AND PLEURA: Small pleural effusions.  Subsegmental airspace disease in the middle lo
be.

HILAR AND MEDIASTINAL STRUCTURES: No identified masses or abnormal nodes.

HEART AND VASCULAR STRUCTURES: Cardiomegaly.  Stable dilatation of the aortic root 5.4 cm.

HARDWARE: None in the chest.

UPPER ABDOMEN: See separate report of the CT of the abdomen.

THYROID AND OTHER SOFT TISSUES: No masses.  No adenopathy.

BONES: No significant finding.

OTHER: Endotracheal tube and nasogastric tube positions appropriate.



IMPRESSION:

1. CHF.  Cannot exclude superimposed pneumonia in the middle lobe.

2. Stable ascending thoracic aortic aneurysm.



TECHNICAL DOCUMENTATION:  JOB ID:  [id number]

Quality ID # [id number]: Final reports with documentation of one or more dose reduction techniques (e.g., Au
tomated exposure control, adjustment of the mA and/or kV according to patient size, use of iterative 
reconstruction technique)

 2011 [email or web address]- All Rights Reserved



Reading location - IP/workstation name: [email or web address]

## 2018-10-14 NOTE — PDOC H&P
History of Present Illness


Admission Date/PCP: 


  10/14/18 12:17





  MONIKA DAMON MD





Patient complains of: SOB


History of Present Illness: 


REUBEN SALINAS is a 85 year old female with a past medical history of COPD, 

hypertension, history of atrial fibrillation not on chronic anticoagulation, 

peripheral vascular disease with placement of 2 femoral stents, history of GI 

bleed, history of bowel obstruction with prior colectomy and reversal of 

ileostomy, history of CVA (right temporal ischemic stroke), REYNALDO not compliant 

not CPAP and history of coronary disease who was brought in because of 

progressive shortness of breath.





Patient is currently intubated.  Family daughter is in the bedside.  Patient's 

daughter says that patient did follow worsening shortness of breath early this 

morning.  She did complain of some chills but denies subjective fever at home.  

Patient did not complain of any chest pain.  Denied palpitations.  Patient use 

her home inhalers but that gave her significant relief.  Patient was brought 

into the ER by EMS and was given breathing treatments in route.  Upon arrival 

to the ER, patient was noted to be obtunded and hence was intubated. She was 

also noted to have bilateral wheezing and some rales and was also given a dose 

of Lasix.





Initial VBG came back with a pH of 7.09 and PCO2 of 75. Chest CT shows some 

congestion, bilateral pleural effusions and possible right middle lobe 

pneumonia.








Past Medical History


Cardiac Medical History: Reports: Atrial Fibrillation, Congestive Heart Failure

, Coronary Artery Disease, Myocardial Infarction, Hyperlipidema, Hypertension, 

Peripheral Vascular Disease


Pulmonary Medical History: Reports: Asthma, Pneumonia


   Denies: Tuberculosis


Neurological Medical History: 


   Denies: Seizures


Musculoskeltal Medical History: Reports: Arthritis





Past Surgical History


Past Surgical History: Reports: Cardiac Catheterization - 3 stent, Coronary 

Stent, Tonsillectomy


   Denies: Pacemaker





Social History


Smoking Status: Unknown if Ever Smoked


Frequency of Alcohol Use: Rare


Hx Recreational Drug Use: No


Hx Prescription Drug Abuse: No





Family History


Family History: Reviewed & Not Pertinent - She has been


Parental Family History Reviewed: Yes - no premature CAD


Children Family History Reviewed: No


Sibling(s) Family History Reviewed.: No





Medication/Allergy


Home Medications: 








Albuterol Sulfate [Ventolin HFA MDI 18 GM] 2 puff IH Q4HP PRN 10/14/18 


Budesonide/Formoterol Fumarate [Symbicort -4.5 mcg Inhaler 6 gm] 2 puff 

IH Q12 10/14/18 


Clonidine [Catapres-Tts 1 (0.1 mg/24 Hr) Transderm Patch] 1 patch TOP ACKERMAN@1000 10

/14/18 


Furosemide [Lasix 20 mg Tablet] 20 mg PO DAILY 10/14/18 


Hydrocodone/Acetaminophen [Norco  mg Tablet] 1 tab PO Q6HP PRN 10/14/18 


Isosorbide Mononitrate [Imdur 30 mg Tablet.er] 30 mg PO DAILY 10/14/18 


Losartan/Hydrochlorothiazide [Hyzaar 100-12.5 Tablet] 1 tab PO DAILY 10/14/18 


Metoprolol Succinate [Toprol Xl 50 mg Tab.sr] 50 mg PO DAILY 10/14/18 


Nitroglycerin [Nitrostat 0.4 mg (1/150 Gr) Tabs 25/Bottle] 0.4 mg SL Q5MP PRN 10

/14/18 


Pantoprazole Sodium [Protonix] 40 mg PO DAILY 10/14/18 


Pravastatin Sodium [Pravachol] 40 mg PO QHS 10/14/18 


Prednisone [Prednisone] 2.5 mg PO DAILY 10/15/18 








Allergies/Adverse Reactions: 


 





No Known Allergies Allergy (Verified 02/17/18 11:55)


 











Review of Systems


ROS unobtainable: Due to endotracheal tube





Physical Exam


Vital Signs: 


 











Temp Pulse Resp BP Pulse Ox


 


 97.7 F      12   114/51 L  96 


 


 10/14/18 13:01     10/14/18 13:01  10/14/18 13:01  10/14/18 13:01











General appearance: PRESENT: other - intubated and sedated


Head exam: PRESENT: atraumatic, normocephalic


Eye exam: PRESENT: conjunctiva pink, EOMI, PERRLA.  ABSENT: scleral icterus


Ear exam: PRESENT: normal external ear exam


Mouth exam: PRESENT: moist, tongue midline


Neck exam: ABSENT: carotid bruit, JVD, lymphadenopathy, thyromegaly


Respiratory exam: PRESENT: clear to auscultation constantine, decreased breath sounds - 

decreased BS on the bases, rhonchi, wheezes - minimal wheezing.  ABSENT: rales


Cardiovascular exam: PRESENT: irregular rhythm.  ABSENT: systolic murmur


Pulses: PRESENT: normal dorsalis pedis pul


GI/Abdominal exam: PRESENT: normal bowel sounds, soft.  ABSENT: mass, 

organolmegaly, rebound, tenderness


Rectal exam: PRESENT: deferred


Neurological exam: PRESENT: other - Patient is currently intubated and sedated.





Results


Impressions: 


 





Chest X-Ray  10/14/18 09:41


IMPRESSION:  COPD with suspected superimposed edema or pneumonia status post 

intubation.  Good position of support apparatus.


 








Chest CT  10/14/18 10:12


IMPRESSION:


1. CHF.  Cannot exclude superimposed pneumonia in the middle lobe.


2. Stable ascending thoracic aortic aneurysm.


 








Head CT  10/14/18 10:12


IMPRESSION:  CHRONIC CHANGES OF ATROPHY AND MICROVASCULAR ISCHEMIA.  NO ACUTE 

PROCESS.


EVIDENCE OF ACUTE STROKE: NO.


 








Abdomen/Pelvis CT  10/14/18 10:13


IMPRESSION:  No acute findings in the abdomen or pelvis.


 














Assessment & Plan





- Diagnosis


(1) Acute hypercapnic respiratory failure


Is this a current diagnosis for this admission?: Yes   


Plan: 


Possibly from COPD exacerbation, concomitant pneumonia and CHF exacerbation. 

Patient is currently intubated.








(2) COPD exacerbation


Is this a current diagnosis for this admission?: Yes   


Plan: 


Upon encounter in the ER, patient has improved breath sounds with only 

occasional wheezes. Start IV steroids and scheduled breathing treatments.








(3) Pneumonia


Qualifiers: 


   Pneumonia type: due to unspecified organism   Laterality: bilateral   Lung 

location: unspecified part of lung   Qualified Code(s): J18.9 - Pneumonia, 

unspecified organism   


Is this a current diagnosis for this admission?: Yes   


Plan: 


This is likely community-acquired pneumonia.  Will start patient on Rocephin 

and azithromycin.  Will obtain sputum cultures.








(4) CHF (congestive heart failure)


Qualifiers: 


   Heart failure type: unspecified   Heart failure chronicity: acute   

Qualified Code(s): I50.9 - Heart failure, unspecified   


Is this a current diagnosis for this admission?: Yes   


Plan: 


Patient's daughter says patient does not have a history of CHF. But on chart 

review, 








- Time


Time Spent: 30 to 50 Minutes

## 2018-10-14 NOTE — RADIOLOGY REPORT (SQ)
EXAM DESCRIPTION:  CHEST SINGLE VIEW



COMPLETED DATE/TIME:  10/14/2018 5:55 pm



REASON FOR STUDY:  central line placement



COMPARISON:  Earlier the same day.



NUMBER OF VIEWS:  One view.



TECHNIQUE:  Single frontal radiographic image of the chest acquired.



LIMITATIONS:  None.



FINDINGS:  LUNGS AND PLEURA: Stable appearance.  No pneumothorax.

MEDIASTINUM AND HEART: Stable heart size and mediastinal structures.

SUPPORT DEVICES: Interval placement of left-sided central line with tip overlying SVC.

BONY STRUCTURES: No acute findings.

HARDWARE: None.

OTHER: No other significant finding.



IMPRESSION:  Appropriate position of left central line.  No pneumothorax.



Reading location - IP/workstation name: [email or web address]

## 2018-10-15 LAB
ABSOLUTE LYMPHOCYTES# (MANUAL): 0.1 10^3/UL (ref 0.5–4.7)
ABSOLUTE MONOCYTES # (MANUAL): 0.1 10^3/UL (ref 0.1–1.4)
ABSOLUTE NEUTROPHILS# (MANUAL): 5.8 10^3/UL (ref 1.7–8.2)
ADD MANUAL DIFF: YES
ANION GAP SERPL CALC-SCNC: 11 MMOL/L (ref 5–19)
ANION GAP SERPL CALC-SCNC: 12 MMOL/L (ref 5–19)
ARTERIAL BLOOD FIO2: (no result)
ARTERIAL BLOOD H2CO3: 1 MMOL/L (ref 1.05–1.35)
ARTERIAL BLOOD HCO3: 23.2 MMOL/L (ref 20–24)
ARTERIAL BLOOD PCO2: 33.1 MMHG (ref 35–45)
ARTERIAL BLOOD PH: 7.46 (ref 7.35–7.45)
ARTERIAL BLOOD PO2: 104.4 MMHG (ref 80–100)
ARTERIAL BLOOD TOTAL CO2: 24.2 MMOL/L (ref 21–25)
BASE EXCESS BLDA CALC-SCNC: -0.2 MMOL/L
BASOPHILS NFR BLD MANUAL: 0 % (ref 0–2)
BUN SERPL-MCNC: 24 MG/DL (ref 7–20)
BUN SERPL-MCNC: 25 MG/DL (ref 7–20)
BURR CELLS BLD QL SMEAR: SLIGHT
CALCIUM: 8.5 MG/DL (ref 8.4–10.2)
CALCIUM: 8.5 MG/DL (ref 8.4–10.2)
CHLORIDE SERPL-SCNC: 101 MMOL/L (ref 98–107)
CHLORIDE SERPL-SCNC: 102 MMOL/L (ref 98–107)
CO2 SERPL-SCNC: 25 MMOL/L (ref 22–30)
CO2 SERPL-SCNC: 25 MMOL/L (ref 22–30)
EOSINOPHIL NFR BLD MANUAL: 0 % (ref 0–6)
ERYTHROCYTE [DISTWIDTH] IN BLOOD BY AUTOMATED COUNT: 13.7 % (ref 11.5–14)
GLUCOSE SERPL-MCNC: 181 MG/DL (ref 75–110)
GLUCOSE SERPL-MCNC: 196 MG/DL (ref 75–110)
HCT VFR BLD CALC: 29 % (ref 36–47)
HGB BLD-MCNC: 10.2 G/DL (ref 12–15.5)
MCH RBC QN AUTO: 31.1 PG (ref 27–33.4)
MCHC RBC AUTO-ENTMCNC: 35.2 G/DL (ref 32–36)
MCV RBC AUTO: 88 FL (ref 80–97)
MONOCYTES % (MANUAL): 2 % (ref 3–13)
NEUTS BAND NFR BLD MANUAL: 1 % (ref 3–5)
PLATELET # BLD: 147 10^3/UL (ref 150–450)
PLATELET COMMENT: (no result)
POIKILOCYTOSIS BLD QL SMEAR: SLIGHT
POTASSIUM SERPL-SCNC: 3.3 MMOL/L (ref 3.6–5)
POTASSIUM SERPL-SCNC: 3.8 MMOL/L (ref 3.6–5)
RBC # BLD AUTO: 3.28 10^6/UL (ref 3.72–5.28)
SAO2 % BLDA: 98.1 % (ref 94–98)
SEGMENTED NEUTROPHILS % (MAN): 95 % (ref 42–78)
SODIUM SERPL-SCNC: 137.1 MMOL/L (ref 137–145)
SODIUM SERPL-SCNC: 138.9 MMOL/L (ref 137–145)
TOTAL CELLS COUNTED BLD: 100
TOXIC GRANULES BLD QL SMEAR: (no result)
VARIANT LYMPHS NFR BLD MANUAL: 2 % (ref 13–45)
WBC # BLD AUTO: 6 10^3/UL (ref 4–10.5)

## 2018-10-15 RX ADMIN — IPRATROPIUM BROMIDE AND ALBUTEROL SULFATE SCH ML: 2.5; .5 SOLUTION RESPIRATORY (INHALATION) at 13:54

## 2018-10-15 RX ADMIN — MIDAZOLAM HYDROCHLORIDE PRN MLS/HR: 5 INJECTION, SOLUTION INTRAMUSCULAR; INTRAVENOUS at 04:30

## 2018-10-15 RX ADMIN — AZITHROMYCIN MONOHYDRATE SCH MLS/HR: 500 INJECTION, POWDER, LYOPHILIZED, FOR SOLUTION INTRAVENOUS at 10:06

## 2018-10-15 RX ADMIN — Medication SCH ML: at 21:23

## 2018-10-15 RX ADMIN — SENNOSIDES, DOCUSATE SODIUM SCH EACH: 50; 8.6 TABLET, FILM COATED ORAL at 13:28

## 2018-10-15 RX ADMIN — SODIUM CHLORIDE PRN MLS/HR: 9 INJECTION, SOLUTION INTRAVENOUS at 17:51

## 2018-10-15 RX ADMIN — MAGNESIUM SULFATE IN DEXTROSE SCH MLS/HR: 10 INJECTION, SOLUTION INTRAVENOUS at 10:06

## 2018-10-15 RX ADMIN — Medication SCH UNIT: at 05:03

## 2018-10-15 RX ADMIN — IPRATROPIUM BROMIDE AND ALBUTEROL SULFATE SCH ML: 2.5; .5 SOLUTION RESPIRATORY (INHALATION) at 01:54

## 2018-10-15 RX ADMIN — Medication SCH: at 13:29

## 2018-10-15 RX ADMIN — MORPHINE SULFATE PRN MG: 10 INJECTION INTRAMUSCULAR; INTRAVENOUS; SUBCUTANEOUS at 19:35

## 2018-10-15 RX ADMIN — PANTOPRAZOLE SODIUM SCH MG: 40 INJECTION, POWDER, FOR SOLUTION INTRAVENOUS at 21:22

## 2018-10-15 RX ADMIN — ATORVASTATIN CALCIUM SCH MG: 10 TABLET, FILM COATED ORAL at 21:22

## 2018-10-15 RX ADMIN — MORPHINE SULFATE PRN MG: 10 INJECTION INTRAMUSCULAR; INTRAVENOUS; SUBCUTANEOUS at 12:09

## 2018-10-15 RX ADMIN — Medication SCH UNIT: at 21:22

## 2018-10-15 RX ADMIN — HEPARIN SODIUM SCH: 5000 INJECTION, SOLUTION INTRAVENOUS; SUBCUTANEOUS at 12:08

## 2018-10-15 RX ADMIN — POTASSIUM CHLORIDE SCH MLS/HR: 29.8 INJECTION, SOLUTION INTRAVENOUS at 06:50

## 2018-10-15 RX ADMIN — Medication SCH ML: at 05:03

## 2018-10-15 RX ADMIN — FUROSEMIDE SCH MG: 10 INJECTION, SOLUTION INTRAMUSCULAR; INTRAVENOUS at 12:25

## 2018-10-15 RX ADMIN — MAGNESIUM SULFATE IN DEXTROSE SCH MLS/HR: 10 INJECTION, SOLUTION INTRAVENOUS at 06:57

## 2018-10-15 RX ADMIN — METHYLPREDNISOLONE SODIUM SUCCINATE SCH MG: 40 INJECTION, POWDER, FOR SOLUTION INTRAMUSCULAR; INTRAVENOUS at 10:06

## 2018-10-15 RX ADMIN — FUROSEMIDE SCH MG: 10 INJECTION, SOLUTION INTRAMUSCULAR; INTRAVENOUS at 21:22

## 2018-10-15 RX ADMIN — POTASSIUM CHLORIDE SCH MLS/HR: 29.8 INJECTION, SOLUTION INTRAVENOUS at 09:15

## 2018-10-15 RX ADMIN — IPRATROPIUM BROMIDE AND ALBUTEROL SULFATE SCH ML: 2.5; .5 SOLUTION RESPIRATORY (INHALATION) at 20:44

## 2018-10-15 RX ADMIN — MIDAZOLAM HYDROCHLORIDE PRN MLS/HR: 5 INJECTION, SOLUTION INTRAMUSCULAR; INTRAVENOUS at 17:51

## 2018-10-15 RX ADMIN — MAGNESIUM SULFATE IN DEXTROSE SCH MLS/HR: 10 INJECTION, SOLUTION INTRAVENOUS at 07:59

## 2018-10-15 RX ADMIN — ENOXAPARIN SODIUM SCH MG: 60 INJECTION SUBCUTANEOUS at 17:50

## 2018-10-15 RX ADMIN — CEFTRIAXONE SODIUM SCH MLS/HR: 1 INJECTION, POWDER, FOR SOLUTION INTRAMUSCULAR; INTRAVENOUS at 11:05

## 2018-10-15 RX ADMIN — IPRATROPIUM BROMIDE AND ALBUTEROL SULFATE SCH ML: 2.5; .5 SOLUTION RESPIRATORY (INHALATION) at 07:58

## 2018-10-15 NOTE — RADIOLOGY REPORT (SQ)
EXAM DESCRIPTION:

XR CHEST 1 VIEW



COMPLETED DATE/TME:  10/15/2018 06:00



CLINICAL HISTORY:

85 years Female, chest congestion



COMPARISON:

One day prior.



NUMBER OF VIEWS/TECHNIQUE:

1/AP 



FINDINGS:



Moderate left lower hemithoracic opacity-effusion, small right

basilar opacity-effusion, mild interstitial markings, moderate

dextroconvexity, atherosclerosis, adequate appearing enteric

tube, left jugular central line tip at the upper right atrium,

and tracheal tube tip is 3.2 cm from the arash.  No

pneumothorax. Stable bony thorax.



IMPRESSION:



No significant change.

## 2018-10-15 NOTE — RADIOLOGY REPORT (SQ)
EXAM DESCRIPTION:  U/S CHEST



COMPLETED DATE/TIME:  10/15/2018 12:34 pm



REASON FOR STUDY:  L pleural effusion



COMPARISON:  CT chest abdomen pelvis 10/14/2018

AP chest 10/15/2018



LIMITATIONS:  None.



PROCEDURE:  Ultrasound of the left pleural space for quantification of pleural fluid

Images acquired during the procedure were stored on PACS.



FINDINGS:  Trace left pleural fluid is in the posterior costophrenic sulcus.  No thoracentesis was pe
rformed



IMPRESSION:  Trace left pleural fluid is present in the posterior costophrenic sulcus.  No thoracente
sis was performed.



TECHNICAL DOCUMENTATION:  JOB ID: [id number]

 2011 [employer]- All Rights Reserved



Reading location - IP/workstation name: [email or web address]

## 2018-10-15 NOTE — PDOC CONSULTATION
Consultation


Consult Date: 10/15/18


Attending physician:: ALANNAH ESCAMILLA


Consult reason:: Acute on chronic respiratory failure





History of Present Illness


Admission Date/PCP: 


  10/14/18 12:17





  MONIKA DAMON MD





History of Present Illness: 


REUBEN SALINAS is a 85 year old female, with multiple medical problems 

presented with increasing shortness of breath she subsequently intubated she 

has had a long history of congestive heart failure COPD tobacco abuse.








Past Medical History


Cardiac Medical History: Reports: Atrial Fibrillation, Congestive Heart Failure

, Coronary Artery Disease, Myocardial Infarction, Hyperlipidema, Hypertension, 

Peripheral Vascular Disease


Pulmonary Medical History: Reports: Asthma, Pneumonia


   Denies: Tuberculosis


Neurological Medical History: 


   Denies: Seizures


Musculoskeltal Medical History: Reports: Arthritis





Past Surgical History


Past Surgical History: Reports: Cardiac Catheterization - 3 stent, Coronary 

Stent, Tonsillectomy


   Denies: Pacemaker





Social History


Information Source: [facility] Records


Smoking Status: Current Some Day Smoker


Frequency of Alcohol Use: Rare


Hx Recreational Drug Use: No


Hx Prescription Drug Abuse: No





Family History


Parental Family History Reviewed: No


Children Family History Reviewed: No


Sibling(s) Family History Reviewed.: No





Medication/Allergy


Home Medications: 








Albuterol Sulfate [Ventolin HFA MDI 18 GM] 2 puff IH Q4HP PRN 10/14/18 


Budesonide/Formoterol Fumarate [Symbicort -4.5 mcg Inhaler 6 gm] 2 puff 

IH Q12 10/14/18 


Clonidine [Catapres-Tts 1 (0.1 mg/24 Hr) Transderm Patch] 1 patch TOP ACKERMAN@1000 10

/14/18 


Furosemide [Lasix 20 mg Tablet] 20 mg PO DAILY 10/14/18 


Hydrocodone/Acetaminophen [Norco  mg Tablet] 1 tab PO Q6HP PRN 10/14/18 


Isosorbide Mononitrate [Imdur 30 mg Tablet.er] 30 mg PO DAILY 10/14/18 


Losartan/Hydrochlorothiazide [Hyzaar 100-12.5 Tablet] 1 tab PO DAILY 10/14/18 


Metoprolol Succinate [Toprol Xl 50 mg Tab.sr] 50 mg PO DAILY 10/14/18 


Nitroglycerin [Nitrostat 0.4 mg (1/150 Gr) Tabs 25/Bottle] 0.4 mg SL Q5MP PRN 10

/14/18 


Pantoprazole Sodium [Protonix] 40 mg PO DAILY 10/14/18 


Pravastatin Sodium [Pravachol] 40 mg PO QHS 10/14/18 


Prednisone [Prednisone] 2.5 mg PO DAILY 10/15/18 








Allergies/Adverse Reactions: 


 





No Known Allergies Allergy (Verified 02/17/18 11:55)


 











Review of Systems


ROS unobtainable: Due to endotracheal tube





Physical Exam


Vital Signs: 


 











Temp Pulse Resp BP Pulse Ox


 


 99.3 F   81   20   103/50 L  99 


 


 10/15/18 08:00  10/15/18 08:00  10/15/18 08:00  10/15/18 08:00  10/15/18 08:00








 Intake & Output











 10/14/18 10/15/18 10/16/18





 06:59 06:59 06:59


 


Intake Total  401 221


 


Output Total  1425 50


 


Balance  -1024 171


 


Weight  53.8 kg 











General appearance: PRESENT: no acute distress, disheveled, well-developed, well

-nourished.  ABSENT: cooperative


Head exam: PRESENT: atraumatic, normocephalic


Eye exam: PRESENT: conjunctiva pale.  ABSENT: EOMI, nystagmus, periorbital 

swelling, scleral icterus


Mouth exam: PRESENT: dry mucosa, neck supple, tongue midline, other - ET tube 

in place


Neck exam: ABSENT: carotid bruit, JVD, lymphadenopathy, thyromegaly, tracheal 

deviation, tracheostomy


Respiratory exam: PRESENT: decreased breath sounds, prolonged expiratory phas, 

rales, rhonchi, unlabored, wheezes.  ABSENT: retraction, stridor


Cardiovascular exam: PRESENT: RRR, +S1, +S2, tachycardia


Pulses: PRESENT: normal radial pulses


GI/Abdominal exam: PRESENT: soft.  ABSENT: tenderness


Gentrourinary exam: PRESENT: indwelling catheter


Extremities exam: ABSENT: calf tenderness, clubbing, joint swelling


Musculoskeletal exam: ABSENT: deformity, dislocation


Neurological exam: ABSENT: awake


Skin exam: PRESENT: dry, warm





Results


Laboratory Results: 


 





 10/15/18 04:55 





 10/15/18 04:55 





 











  10/14/18 10/14/18 10/14/18





  14:01 17:30 17:30


 


WBC    6.1


 


RBC    3.72


 


Hgb    11.4 L


 


Hct    32.6 L


 


MCV    88


 


MCH    30.6


 


MCHC    34.9


 


RDW    13.7


 


Plt Count    167


 


Seg Neutrophils %    78.7 H


 


Lymphocytes %    9.3 L


 


Monocytes %    11.7


 


Eosinophils %    0.1


 


Basophils %    0.2


 


Absolute Neutrophils    4.8


 


Absolute Lymphocytes    0.6


 


Absolute Monocytes    0.7


 


Absolute Eosinophils    0.0


 


Absolute Basophils    0.0


 


Carbonic Acid   


 


HCO3/H2CO3 Ratio   


 


ABG pH   


 


ABG pCO2   


 


ABG pO2   


 


ABG HCO3   


 


ABG O2 Saturation   


 


ABG Base Excess   


 


FiO2   


 


Sodium   


 


Potassium   


 


Chloride   


 


Carbon Dioxide   


 


Anion Gap   


 


BUN   


 


Creatinine   


 


Est GFR ( Amer)   


 


Est GFR (Non-Af Amer)   


 


Glucose   


 


Lactic Acid  2.6 H  1.3 


 


Calcium   


 


Phosphorus   


 


Magnesium   


 


Total Bilirubin   


 


AST   


 


ALT   


 


Alkaline Phosphatase   


 


Total Protein   


 


Albumin   














  10/14/18 10/14/18 10/14/18





  17:30 17:50 23:05


 


WBC   


 


RBC   


 


Hgb   


 


Hct   


 


MCV   


 


MCH   


 


MCHC   


 


RDW   


 


Plt Count   


 


Seg Neutrophils %   


 


Lymphocytes %   


 


Monocytes %   


 


Eosinophils %   


 


Basophils %   


 


Absolute Neutrophils   


 


Absolute Lymphocytes   


 


Absolute Monocytes   


 


Absolute Eosinophils   


 


Absolute Basophils   


 


Carbonic Acid   1.22 


 


HCO3/H2CO3 Ratio   23:1 


 


ABG pH   7.47 H 


 


ABG pCO2   40.4 


 


ABG pO2   99.4 


 


ABG HCO3   28.5 H 


 


ABG O2 Saturation   97.8 


 


ABG Base Excess   4.4 


 


FiO2   40% 


 


Sodium  138.2  


 


Potassium  3.6  


 


Chloride  102  


 


Carbon Dioxide  29  


 


Anion Gap  7  


 


BUN  27 H  


 


Creatinine  1.07  


 


Est GFR ( Amer)  59 L  


 


Est GFR (Non-Af Amer)  49 L  


 


Glucose  86  


 


Lactic Acid    1.3


 


Calcium  9.0  


 


Phosphorus  3.3  


 


Magnesium  1.3 L  


 


Total Bilirubin  0.6  


 


AST  54 H  


 


ALT  60 H  


 


Alkaline Phosphatase  40  


 


Total Protein  5.5 L  


 


Albumin  3.2 L  














  10/15/18 10/15/18 10/15/18





  04:55 04:55 04:55


 


WBC    6.0


 


RBC    3.28 L


 


Hgb    10.2 L


 


Hct    29.0 L


 


MCV    88


 


MCH    31.1


 


MCHC    35.2


 


RDW    13.7


 


Plt Count    147 L


 


Seg Neutrophils %    Not Reportable


 


Lymphocytes %    Not Reportable


 


Monocytes %    Not Reportable


 


Eosinophils %    Not Reportable


 


Basophils %    Not Reportable


 


Absolute Neutrophils    Not Reportable


 


Absolute Lymphocytes    Not Reportable


 


Absolute Monocytes    Not Reportable


 


Absolute Eosinophils    Not Reportable


 


Absolute Basophils    Not Reportable


 


Carbonic Acid  1.00 L  


 


HCO3/H2CO3 Ratio  23:1  


 


ABG pH  7.46 H  


 


ABG pCO2  33.1 L  


 


ABG pO2  104.4 H  


 


ABG HCO3  23.2  


 


ABG O2 Saturation  98.1 H  


 


ABG Base Excess  -0.2  


 


FiO2  40%  


 


Sodium   138.9 


 


Potassium   3.3 L 


 


Chloride   102 


 


Carbon Dioxide   25 


 


Anion Gap   12 


 


BUN   25 H 


 


Creatinine   1.08 


 


Est GFR ( Amer)   58 L 


 


Est GFR (Non-Af Amer)   48 L 


 


Glucose   196 H 


 


Lactic Acid   


 


Calcium   8.5 


 


Phosphorus   


 


Magnesium   1.1 L* 


 


Total Bilirubin   


 


AST   


 


ALT   


 


Alkaline Phosphatase   


 


Total Protein   


 


Albumin   











Impressions: 


 





Chest CT  10/14/18 10:12


IMPRESSION:


1. CHF.  Cannot exclude superimposed pneumonia in the middle lobe.


2. Stable ascending thoracic aortic aneurysm.


 








Head CT  10/14/18 10:12


IMPRESSION:  CHRONIC CHANGES OF ATROPHY AND MICROVASCULAR ISCHEMIA.  NO ACUTE 

PROCESS.


EVIDENCE OF ACUTE STROKE: NO.


 








Abdomen/Pelvis CT  10/14/18 10:13


IMPRESSION:  No acute findings in the abdomen or pelvis.


 








Chest X-Ray  10/15/18 06:00


IMPRESSION:


 


No significant change.


 














Assessment & Plan





- Diagnosis


(1) Pleural effusion


Is this a current diagnosis for this admission?: Yes   


Plan: 


Left thoracentesis








(2) Acute hypercapnic respiratory failure


Is this a current diagnosis for this admission?: Yes   


Plan: 


Ventilation SaO2 greater than 90 attempt to maintain pH within the normal range








(3) COPD exacerbation


Is this a current diagnosis for this admission?: Yes   


Plan: 


Laba + long acting muscarinic agent and Nakita as needed








(4) Pneumonia


Qualifiers: 


   Pneumonia type: due to unspecified organism   Laterality: bilateral   Lung 

location: unspecified part of lung   Qualified Code(s): J18.9 - Pneumonia, 

unspecified organism   


Is this a current diagnosis for this admission?: Yes   


Plan: 


Treat for community-acquired pneumonia








- Time


Total Critical Time (Minutes): 60

## 2018-10-15 NOTE — PDOC PROGRESS REPORT
Subjective


Progress Note for:: 10/15/18


Subjective:: 


REUBEN SALINAS is a 85 year old female with a past medical history of COPD, 

hypertension, history of atrial fibrillation not on chronic anticoagulation, 

peripheral vascular disease with placement of 2 femoral stents, history of GI 

bleed, history of bowel obstruction with prior colectomy and reversal of 

ileostomy, history of CVA (right temporal ischemic stroke), REYNALDO not compliant 

not CPAP and history of coronary disease who was brought in because of 

progressive shortness of breath. She was obtunded upon arrival and was 

intubated. Her VBG in the ER shows a pH of 7.09 and PCO2 of 75. Chest CT shows 

some congestion, bilateral pleural effusions and possible right middle lobe 

pneumonia.





Patient also was noted to be in Afib with RVR and was started on cardizem drip 

overnight. 





This morning, she remain intubated. Heart rate in the 70s. Cardizem is being 

weaned down. Blood pressures have improved to 100/70s. No significant 

secretions from the ET.








Reason For Visit: 


ACUTE RESPIRATORY FAILURE








Physical Exam


Vital Signs: 


 











Temp Pulse Resp BP Pulse Ox


 


 99.5 F   78   14   106/50 L  100 


 


 10/15/18 12:00  10/15/18 14:00  10/15/18 14:00  10/15/18 14:00  10/15/18 14:00








 Intake & Output











 10/14/18 10/15/18 10/16/18





 06:59 06:59 06:59


 


Intake Total  401 371


 


Output Total  1425 1384


 


Balance  -1024 -1013


 


Weight  118 lb 9.739 oz 











General appearance: PRESENT: other - intubated and sedated


Head exam: PRESENT: atraumatic, normocephalic


Eye exam: PRESENT: conjunctiva pink, EOMI, PERRLA.  ABSENT: scleral icterus


Ear exam: PRESENT: normal external ear exam


Mouth exam: PRESENT: moist, tongue midline


Neck exam: ABSENT: carotid bruit, JVD, lymphadenopathy, thyromegaly


Respiratory exam: PRESENT: clear to auscultation constantine.  ABSENT: rales, rhonchi, 

wheezes


Cardiovascular exam: PRESENT: irregular rhythm.  ABSENT: diastolic murmur, rubs


Pulses: PRESENT: normal dorsalis pedis pul


GI/Abdominal exam: PRESENT: normal bowel sounds, soft.  ABSENT: distended, 

guarding, mass, organolmegaly, rebound, tenderness


Neurological exam: PRESENT: other - intubated and sedated





Results


Laboratory Results: 


 





 10/15/18 04:55 





 10/15/18 13:05 





 











  10/14/18 10/14/18 10/14/18





  17:30 17:30 17:30


 


WBC   6.1 


 


RBC   3.72 


 


Hgb   11.4 L 


 


Hct   32.6 L 


 


MCV   88 


 


MCH   30.6 


 


MCHC   34.9 


 


RDW   13.7 


 


Plt Count   167 


 


Seg Neutrophils %   78.7 H 


 


Lymphocytes %   9.3 L 


 


Monocytes %   11.7 


 


Eosinophils %   0.1 


 


Basophils %   0.2 


 


Absolute Neutrophils   4.8 


 


Absolute Lymphocytes   0.6 


 


Absolute Monocytes   0.7 


 


Absolute Eosinophils   0.0 


 


Absolute Basophils   0.0 


 


Carbonic Acid   


 


HCO3/H2CO3 Ratio   


 


ABG pH   


 


ABG pCO2   


 


ABG pO2   


 


ABG HCO3   


 


ABG O2 Saturation   


 


ABG Base Excess   


 


FiO2   


 


Sodium    138.2


 


Potassium    3.6


 


Chloride    102


 


Carbon Dioxide    29


 


Anion Gap    7


 


BUN    27 H


 


Creatinine    1.07


 


Est GFR ( Amer)    59 L


 


Est GFR (Non-Af Amer)    49 L


 


Glucose    86


 


Lactic Acid  1.3  


 


Calcium    9.0


 


Phosphorus    3.3


 


Magnesium    1.3 L


 


Total Bilirubin    0.6


 


AST    54 H


 


ALT    60 H


 


Alkaline Phosphatase    40


 


Total Protein    5.5 L


 


Albumin    3.2 L














  10/14/18 10/14/18 10/15/18





  17:50 23:05 04:55


 


WBC   


 


RBC   


 


Hgb   


 


Hct   


 


MCV   


 


MCH   


 


MCHC   


 


RDW   


 


Plt Count   


 


Seg Neutrophils %   


 


Lymphocytes %   


 


Monocytes %   


 


Eosinophils %   


 


Basophils %   


 


Absolute Neutrophils   


 


Absolute Lymphocytes   


 


Absolute Monocytes   


 


Absolute Eosinophils   


 


Absolute Basophils   


 


Carbonic Acid  1.22   1.00 L


 


HCO3/H2CO3 Ratio  23:1   23:1


 


ABG pH  7.47 H   7.46 H


 


ABG pCO2  40.4   33.1 L


 


ABG pO2  99.4   104.4 H


 


ABG HCO3  28.5 H   23.2


 


ABG O2 Saturation  97.8   98.1 H


 


ABG Base Excess  4.4   -0.2


 


FiO2  40%   40%


 


Sodium   


 


Potassium   


 


Chloride   


 


Carbon Dioxide   


 


Anion Gap   


 


BUN   


 


Creatinine   


 


Est GFR ( Amer)   


 


Est GFR (Non-Af Amer)   


 


Glucose   


 


Lactic Acid   1.3 


 


Calcium   


 


Phosphorus   


 


Magnesium   


 


Total Bilirubin   


 


AST   


 


ALT   


 


Alkaline Phosphatase   


 


Total Protein   


 


Albumin   














  10/15/18 10/15/18 10/15/18





  04:55 04:55 13:05


 


WBC   6.0 


 


RBC   3.28 L 


 


Hgb   10.2 L 


 


Hct   29.0 L 


 


MCV   88 


 


MCH   31.1 


 


MCHC   35.2 


 


RDW   13.7 


 


Plt Count   147 L 


 


Seg Neutrophils %   Not Reportable 


 


Lymphocytes %   Not Reportable 


 


Monocytes %   Not Reportable 


 


Eosinophils %   Not Reportable 


 


Basophils %   Not Reportable 


 


Absolute Neutrophils   Not Reportable 


 


Absolute Lymphocytes   Not Reportable 


 


Absolute Monocytes   Not Reportable 


 


Absolute Eosinophils   Not Reportable 


 


Absolute Basophils   Not Reportable 


 


Carbonic Acid   


 


HCO3/H2CO3 Ratio   


 


ABG pH   


 


ABG pCO2   


 


ABG pO2   


 


ABG HCO3   


 


ABG O2 Saturation   


 


ABG Base Excess   


 


FiO2   


 


Sodium  138.9   137.1


 


Potassium  3.3 L   3.8


 


Chloride  102   101


 


Carbon Dioxide  25   25


 


Anion Gap  12   11


 


BUN  25 H   24 H


 


Creatinine  1.08   0.98


 


Est GFR ( Amer)  58 L   > 60


 


Est GFR (Non-Af Amer)  48 L   54 L


 


Glucose  196 H   181 H


 


Lactic Acid   


 


Calcium  8.5   8.5


 


Phosphorus   


 


Magnesium  1.1 L*   2.6 H D


 


Total Bilirubin   


 


AST   


 


ALT   


 


Alkaline Phosphatase   


 


Total Protein   


 


Albumin   








 











  10/15/18





  13:35


 


Troponin I  0.224











Impressions: 


 





Chest CT  10/14/18 10:12


IMPRESSION:


1. CHF.  Cannot exclude superimposed pneumonia in the middle lobe.


2. Stable ascending thoracic aortic aneurysm.


 








Head CT  10/14/18 10:12


IMPRESSION:  CHRONIC CHANGES OF ATROPHY AND MICROVASCULAR ISCHEMIA.  NO ACUTE 

PROCESS.


EVIDENCE OF ACUTE STROKE: NO.


 








Abdomen/Pelvis CT  10/14/18 10:13


IMPRESSION:  No acute findings in the abdomen or pelvis.


 








Chest X-Ray  10/15/18 06:00


IMPRESSION:


 


No significant change.


 








Chest Ultrasound  10/15/18 09:05


IMPRESSION:  Trace left pleural fluid is present in the posterior costophrenic 

sulcus.  No thoracentesis was performed.


 














Assessment & Plan





- Diagnosis


(1) Acute hypercapnic respiratory failure


Is this a current diagnosis for this admission?: Yes   


Plan: 


Possibly from COPD exacerbation, concomitant pneumonia and CHF exacerbation. 

Patient is currently intubated.








(2) COPD exacerbation


Is this a current diagnosis for this admission?: Yes   


Plan: 


No wheezing today. Decrease solumedrol to 40 mg daily.








(3) Pneumonia


Qualifiers: 


   Pneumonia type: due to unspecified organism   Laterality: bilateral   Lung 

location: unspecified part of lung   Qualified Code(s): J18.9 - Pneumonia, 

unspecified organism   


Is this a current diagnosis for this admission?: Yes   


Plan: 


This is likely community-acquired pneumonia.  Continue Rocephin and 

azithromycin. Pending sputum cultures.








(4) CHF (congestive heart failure)


Qualifiers: 


   Heart failure type: unspecified   Heart failure chronicity: acute   

Qualified Code(s): I50.9 - Heart failure, unspecified   


Is this a current diagnosis for this admission?: Yes   


Plan: 


Echo shows an EF of 30-35%. Patient was only given a dose of 20 mg Lasix 

yesterday due to low blood pressures. Will continue diuresis today with 20 mg 

IV q12 and will increase as blood pressure tolerates. Pleural effusions noted 

on CT yesterday are likely related to her CHF. Chest US ordered by pulm today 

only shows very minimal pleural fluid. Monitor I&Os.








(5) NSTEMI (non-ST elevated myocardial infarction)


Is this a current diagnosis for this admission?: Yes   


Plan: 


Troponins trended up to 0.224. Possible NSTEMI vs demand ischemia. Discussed 

with Dr. Jones. Will treat with Lovenox for now. Will start aspirin, plavix and 

statin.








(6) Do not resuscitate


Is this a current diagnosis for this admission?: Yes   


Plan: 


Discussed with family in length including plan of care. Patient has previously 

refused possible intervention of her aortic aneurysm and does not want any 

invasive or major surgery. They expressed they do not want chest compressions 

if she arrests. She will  remain on the vent and will be weaned off per pulm 

after a day or two of diuresis.








- Time


Time Spent with patient: 25-34 minutes

## 2018-10-15 NOTE — PDOC CONSULTATION
Consultation


Consult Date: 10/15/18


Attending physician:: ALANNAH ESCAMILLA


Consult reason:: Increased ventricular ectopy and depressed LVEF





History of Present Illness


Admission Date/PCP: 


  10/14/18 12:17





  MONIKA DAMON MD





Patient complains of: Patient is intubated and sedated.


History of Present Illness: 


REUBEN SALINAS is a 85 year old female with a past medical history of COPD, 

hypertension, history of atrial fibrillation not on chronic anticoagulation, 

peripheral vascular disease with placement of 2 femoral stents, history of GI 

bleed, history of bowel obstruction with prior colectomy and reversal of 

ileostomy, history of CVA (right temporal ischemic stroke), REYNALDO not compliant 

not CPAP and history of coronary disease who was brought in because of 

progressive shortness of breath.





Patient is currently intubated.  Family daughter is in the bedside.  Patient's 

daughter says that patient did follow worsening shortness of breath early this 

morning.  She did complain of some chills but denies subjective fever at home.  

Patient did not complain of any chest pain.  Denied palpitations.  Patient use 

her home inhalers but that gave her significant relief.  Patient was brought 

into the ER by EMS and was given breathing treatments in route.  Upon arrival 

to the ER, patient was noted to be obtunded and hence was intubated. She was 

also noted to have bilateral wheezing and some rales and was also given a dose 

of Lasix.





Initial VBG came back with a pH of 7.09 and PCO2 of 75. Chest CT shows some 

congestion, bilateral pleural effusions and possible right middle lobe 

pneumonia.





This history obtained by the hospitalist was confirmed and reviewed.  This 

morning rounds, pulmonologist and internist noted increased ventricular ectopy.

  2D echo obtained yesterday showed significantly depressed LVEF.  I was 

therefore consulted to help with management.  Patient still intubated and 

sedated.  She could not give any history.  Family members not available. 

patient subsequently made a DNR.





Past Medical History


Cardiac Medical History: Reports: Atrial Fibrillation, Congestive Heart Failure

, Coronary Artery Disease, Myocardial Infarction, Hyperlipidema, Hypertension, 

Peripheral Vascular Disease


Pulmonary Medical History: Reports: Asthma, Pneumonia


   Denies: Tuberculosis


Neurological Medical History: 


   Denies: Seizures


Musculoskeltal Medical History: Reports: Arthritis





Past Surgical History


Past Surgical History: Reports: Cardiac Catheterization - 3 stent, Coronary 

Stent, Tonsillectomy


   Denies: Pacemaker





Social History


Information Source: Relative


Smoking Status: Unknown if Ever Smoked


Frequency of Alcohol Use: Rare


Hx Recreational Drug Use: No


Hx Prescription Drug Abuse: No





- Advance Directive


Resuscitation Status: Do Not Resuscitate


Surrogate healthcare decision maker:: 





Patient's daughter is the surrogate decision-maker





Family History


Family History: Reviewed & Not Pertinent - She has been


Parental Family History Reviewed: Yes


Children Family History Reviewed: Yes


Sibling(s) Family History Reviewed.: Yes - Felt to be noncontributory at patient

's age





Medication/Allergy


Home Medications: 








Albuterol Sulfate [Ventolin HFA MDI 18 GM] 2 puff IH Q4HP PRN 10/14/18 


Budesonide/Formoterol Fumarate [Symbicort -4.5 mcg Inhaler 6 gm] 2 puff 

IH Q12 10/14/18 


Clonidine [Catapres-Tts 1 (0.1 mg/24 Hr) Transderm Patch] 1 patch TOP ACKERMAN@1000 10

/14/18 


Furosemide [Lasix 20 mg Tablet] 20 mg PO DAILY 10/14/18 


Hydrocodone/Acetaminophen [Norco  mg Tablet] 1 tab PO Q6HP PRN 10/14/18 


Isosorbide Mononitrate [Imdur 30 mg Tablet.er] 30 mg PO DAILY 10/14/18 


Losartan/Hydrochlorothiazide [Hyzaar 100-12.5 Tablet] 1 tab PO DAILY 10/14/18 


Metoprolol Succinate [Toprol Xl 50 mg Tab.sr] 50 mg PO DAILY 10/14/18 


Nitroglycerin [Nitrostat 0.4 mg (1/150 Gr) Tabs 25/Bottle] 0.4 mg SL Q5MP PRN 10

/14/18 


Pantoprazole Sodium [Protonix] 40 mg PO DAILY 10/14/18 


Pravastatin Sodium [Pravachol] 40 mg PO QHS 10/14/18 


Prednisone [Prednisone] 2.5 mg PO DAILY 10/15/18 








Allergies/Adverse Reactions: 


 





No Known Allergies Allergy (Verified 02/17/18 11:55)


 











Review of Systems


ROS unobtainable: Due to endotracheal tube





Physical Exam


Vital Signs: 


 











Temp Pulse Resp BP Pulse Ox


 


 97.9 F   76   14   108/41 L  97 


 


 10/15/18 22:00  10/15/18 20:45  10/15/18 22:22  10/15/18 22:22  10/15/18 22:22








 Intake & Output











 10/14/18 10/15/18 10/16/18





 06:59 06:59 06:59


 


Intake Total  401 1397


 


Output Total  1425 1704


 


Balance  -1024 -307


 


Weight  53.8 kg 











Exam: 





GENERAL: well-nourished and in no acute distress.  Patient is intubated and 

sedated.  Orientation cannot be checked


HEAD: Atraumatic, normocephalic.


EYES: Pupils equal round and reactive to light, extraocular movements could not 

be checked, sclera anicteric, conjunctiva are normal.


ENT: TMs normal, nares patent, oropharynx clear without exudates. Moist mucous 

membranes.  No oral ulcerations or bleeding gums noted


NECK: supple without lymphadenopathy or JVD.  Trachea is central.  No cervical 

or axillary lymphadenopathy noted.  Carotids are 2+


LUNGS: Breath sounds mostly clear to auscultation patient is noted to have 

bibasal crackles at the extreme bases


CHEST: Palpation of the chest wall shows no significant chest wall tenderness 

or abnormalities.


HEART: [place] NP, No PSH, 2/6 RUDY aortic area, 1/6 pan systolic murmur mitral area

, no rubs or gallops.


ABDOMEN: Soft, no significant tenderness appreciated, normoactive bowel sounds. 

No guarding, no rebound.  No rigidity noted . No masses appreciated.


EXTREMITIES: Pedal pulses are 1-2+, no calf tenderness noted,


1+ pedal edema noted. No clubbing or cyanosis.


NEUROLOGICAL: The patient cannot participate in the neurological exam but no 

facial asymmetry noted.  Extremities slightly hypotonic


PSYCH: This cannot be evaluated.  Patient cannot participate.


SKIN: No significant ecchymosis, rash, or signs of pruritus noted.


MUSCULOSKELETAL EXAM: No significant joint swelling noted.  Patient cannot 

participate in musculoskeletal exam





Results


Laboratory Results: 


 





 10/15/18 04:55 





 10/15/18 13:05 





 











  10/14/18 10/15/18 10/15/18





  23:05 04:55 04:55


 


WBC   


 


RBC   


 


Hgb   


 


Hct   


 


MCV   


 


MCH   


 


MCHC   


 


RDW   


 


Plt Count   


 


Seg Neutrophils %   


 


Lymphocytes %   


 


Monocytes %   


 


Eosinophils %   


 


Basophils %   


 


Absolute Neutrophils   


 


Absolute Lymphocytes   


 


Absolute Monocytes   


 


Absolute Eosinophils   


 


Absolute Basophils   


 


Carbonic Acid   1.00 L 


 


HCO3/H2CO3 Ratio   23:1 


 


ABG pH   7.46 H 


 


ABG pCO2   33.1 L 


 


ABG pO2   104.4 H 


 


ABG HCO3   23.2 


 


ABG O2 Saturation   98.1 H 


 


ABG Base Excess   -0.2 


 


FiO2   40% 


 


Sodium    138.9


 


Potassium    3.3 L


 


Chloride    102


 


Carbon Dioxide    25


 


Anion Gap    12


 


BUN    25 H


 


Creatinine    1.08


 


Est GFR ( Amer)    58 L


 


Est GFR (Non-Af Amer)    48 L


 


Glucose    196 H


 


Lactic Acid  1.3  


 


Calcium    8.5


 


Magnesium    1.1 L*














  10/15/18 10/15/18





  04:55 13:05


 


WBC  6.0 


 


RBC  3.28 L 


 


Hgb  10.2 L 


 


Hct  29.0 L 


 


MCV  88 


 


MCH  31.1 


 


MCHC  35.2 


 


RDW  13.7 


 


Plt Count  147 L 


 


Seg Neutrophils %  Not Reportable 


 


Lymphocytes %  Not Reportable 


 


Monocytes %  Not Reportable 


 


Eosinophils %  Not Reportable 


 


Basophils %  Not Reportable 


 


Absolute Neutrophils  Not Reportable 


 


Absolute Lymphocytes  Not Reportable 


 


Absolute Monocytes  Not Reportable 


 


Absolute Eosinophils  Not Reportable 


 


Absolute Basophils  Not Reportable 


 


Carbonic Acid  


 


HCO3/H2CO3 Ratio  


 


ABG pH  


 


ABG pCO2  


 


ABG pO2  


 


ABG HCO3  


 


ABG O2 Saturation  


 


ABG Base Excess  


 


FiO2  


 


Sodium   137.1


 


Potassium   3.8


 


Chloride   101


 


Carbon Dioxide   25


 


Anion Gap   11


 


BUN   24 H


 


Creatinine   0.98


 


Est GFR ( Amer)   > 60


 


Est GFR (Non-Af Amer)   54 L


 


Glucose   181 H


 


Lactic Acid  


 


Calcium   8.5


 


Magnesium   2.6 H D








 











  10/15/18





  13:35


 


Troponin I  0.224











EKG Comments: 





Atrial fibrillation with rapid ventricular response.  Cannot rule out prior 

anterior MI.  Frequent ventricular ectopy also noted.


Impressions: 


 





Chest CT  10/14/18 10:12


IMPRESSION:


1. CHF.  Cannot exclude superimposed pneumonia in the middle lobe.


2. Stable ascending thoracic aortic aneurysm.


 








Head CT  10/14/18 10:12


IMPRESSION:  CHRONIC CHANGES OF ATROPHY AND MICROVASCULAR ISCHEMIA.  NO ACUTE 

PROCESS.


EVIDENCE OF ACUTE STROKE: NO.


 








Abdomen/Pelvis CT  10/14/18 10:13


IMPRESSION:  No acute findings in the abdomen or pelvis.


 








Chest X-Ray  10/15/18 06:00


IMPRESSION:


 


No significant change.


 








Chest Ultrasound  10/15/18 09:05


IMPRESSION:  Trace left pleural fluid is present in the posterior costophrenic 

sulcus.  No thoracentesis was performed.


 














Assessment & Plan





- Diagnosis


(1) NSTEMI (non-ST elevated myocardial infarction)


Is this a current diagnosis for this admission?: Yes   





(2) Pleural effusion


Is this a current diagnosis for this admission?: Yes   





(3) Respiratory failure


Qualifiers: 


   Chronicity: acute   Respiratory failure complication: hypercapnia   

Qualified Code(s): J96.02 - Acute respiratory failure with hypercapnia   


Is this a current diagnosis for this admission?: Yes   





(4) CHF (congestive heart failure)


Qualifiers: 


   Heart failure type: combined systolic and diastolic   Heart failure 

chronicity: acute   Qualified Code(s): I50.41 - Acute combined systolic (

congestive) and diastolic (congestive) heart failure   


Is this a current diagnosis for this admission?: Yes   





(5) Hypomagnesemia


Is this a current diagnosis for this admission?: Yes   





(6) Hypokalemia


Is this a current diagnosis for this admission?: Yes   





- Notes


Notes: 


Non-STEMI: Felt to be based to troponin I elevation.  Most likely related to 

metabolic reason.  Cannot rule out acute coronary syndrome.  Agree with 

anticoagulation with heparin.  Do not feel patient will be a candidate for any 

ischemia workup.  Recommend medical management with antiplatelets, beta blockers

, Ranexa, statins, add ACE inhibitor/ARB/entresto therapy once patient blood 

pressure is more stable.


Increased ventricular ectopy: Most likely related to electrolyte imbalance.  

These are being corrected.  Do not feel antiarrhythmics needed at this point.  

Should patient have sustained ventricular arrhythmia, consider amiodarone 

infusion after bolus.


Congestive heart failure: Patient has systolic and diastolic dysfunction, most 

likely acute on chronic.  Patient started on diuretic therapy.  Continue with 

it.


COPD: Currently intubated and ventilated.  Patient being managed by 

pulmonologist.


Coronary artery disease: Patient has a history of coronary artery disease with 

prior stent placement.  Optimize medical management.


Hypomagnesemia: This is already being replaced by the hospitalist.  


Hypokalemia: Patient will benefit from spironolactone therapy and also 

replacement.


Respiratory failure: Continue with oxygenation and ventilation.  Pulmonary 

managing.  Most likely related to combination of CHF and COPD with pneumonia.


Patient has multiple other comorbid diagnosis and is being well managed overall 

by the hospitalist and pulmonologist.


Dr. Mayo covering from tomorrow.





- Time


Time Spent: 50 to 70 Minutes


Medications reviewed and adjusted accordingly: Yes

## 2018-10-16 LAB
ABSOLUTE LYMPHOCYTES# (MANUAL): 0.4 10^3/UL (ref 0.5–4.7)
ABSOLUTE MONOCYTES # (MANUAL): 0.4 10^3/UL (ref 0.1–1.4)
ABSOLUTE NEUTROPHILS# (MANUAL): 8.6 10^3/UL (ref 1.7–8.2)
ACANTHOCYTES BLD QL SMEAR: SLIGHT
ADD MANUAL DIFF: YES
ANION GAP SERPL CALC-SCNC: 9 MMOL/L (ref 5–19)
ARTERIAL BLOOD FIO2: (no result)
ARTERIAL BLOOD H2CO3: 1.05 MMOL/L (ref 1.05–1.35)
ARTERIAL BLOOD HCO3: 24.3 MMOL/L (ref 20–24)
ARTERIAL BLOOD PCO2: 35 MMHG (ref 35–45)
ARTERIAL BLOOD PH: 7.46 (ref 7.35–7.45)
ARTERIAL BLOOD PO2: 65.1 MMHG (ref 80–100)
ARTERIAL BLOOD TOTAL CO2: 25.4 MMOL/L (ref 21–25)
BASE EXCESS BLDA CALC-SCNC: 0.8 MMOL/L
BASOPHILS NFR BLD MANUAL: 0 % (ref 0–2)
BUN SERPL-MCNC: 26 MG/DL (ref 7–20)
CALCIUM: 8.7 MG/DL (ref 8.4–10.2)
CHLORIDE SERPL-SCNC: 104 MMOL/L (ref 98–107)
CO2 SERPL-SCNC: 25 MMOL/L (ref 22–30)
DACRYOCYTES BLD QL SMEAR: SLIGHT
EOSINOPHIL NFR BLD MANUAL: 0 % (ref 0–6)
ERYTHROCYTE [DISTWIDTH] IN BLOOD BY AUTOMATED COUNT: 13.8 % (ref 11.5–14)
GLUCOSE SERPL-MCNC: 143 MG/DL (ref 75–110)
HCT VFR BLD CALC: 30.2 % (ref 36–47)
HGB BLD-MCNC: 10.7 G/DL (ref 12–15.5)
MCH RBC QN AUTO: 31.2 PG (ref 27–33.4)
MCHC RBC AUTO-ENTMCNC: 35.3 G/DL (ref 32–36)
MCV RBC AUTO: 88 FL (ref 80–97)
MONOCYTES % (MANUAL): 4 % (ref 3–13)
OVALOCYTES BLD QL SMEAR: (no result)
PLATELET # BLD: 164 10^3/UL (ref 150–450)
PLATELET COMMENT: ADEQUATE
POIKILOCYTOSIS BLD QL SMEAR: (no result)
POTASSIUM SERPL-SCNC: 3.3 MMOL/L (ref 3.6–5)
RBC # BLD AUTO: 3.42 10^6/UL (ref 3.72–5.28)
SAO2 % BLDA: 94 % (ref 94–98)
SEGMENTED NEUTROPHILS % (MAN): 92 % (ref 42–78)
SODIUM SERPL-SCNC: 137.8 MMOL/L (ref 137–145)
TOTAL CELLS COUNTED BLD: 100
TOXIC GRANULES BLD QL SMEAR: SLIGHT
VARIANT LYMPHS NFR BLD MANUAL: 4 % (ref 13–45)
WBC # BLD AUTO: 9.4 10^3/UL (ref 4–10.5)

## 2018-10-16 RX ADMIN — SODIUM CHLORIDE PRN MLS/HR: 9 INJECTION, SOLUTION INTRAVENOUS at 17:19

## 2018-10-16 RX ADMIN — MORPHINE SULFATE PRN MG: 10 INJECTION INTRAMUSCULAR; INTRAVENOUS; SUBCUTANEOUS at 08:31

## 2018-10-16 RX ADMIN — Medication SCH: at 14:00

## 2018-10-16 RX ADMIN — FUROSEMIDE SCH MG: 10 INJECTION, SOLUTION INTRAMUSCULAR; INTRAVENOUS at 10:13

## 2018-10-16 RX ADMIN — IPRATROPIUM BROMIDE AND ALBUTEROL SULFATE SCH ML: 2.5; .5 SOLUTION RESPIRATORY (INHALATION) at 14:13

## 2018-10-16 RX ADMIN — IPRATROPIUM BROMIDE AND ALBUTEROL SULFATE SCH ML: 2.5; .5 SOLUTION RESPIRATORY (INHALATION) at 02:25

## 2018-10-16 RX ADMIN — AZITHROMYCIN MONOHYDRATE SCH MLS/HR: 500 INJECTION, POWDER, LYOPHILIZED, FOR SOLUTION INTRAVENOUS at 11:37

## 2018-10-16 RX ADMIN — ATORVASTATIN CALCIUM SCH MG: 10 TABLET, FILM COATED ORAL at 21:36

## 2018-10-16 RX ADMIN — IPRATROPIUM BROMIDE AND ALBUTEROL SULFATE SCH ML: 2.5; .5 SOLUTION RESPIRATORY (INHALATION) at 08:16

## 2018-10-16 RX ADMIN — METHYLPREDNISOLONE SODIUM SUCCINATE SCH MG: 40 INJECTION, POWDER, FOR SOLUTION INTRAMUSCULAR; INTRAVENOUS at 10:12

## 2018-10-16 RX ADMIN — ASPIRIN SCH MG: 81 TABLET, CHEWABLE ORAL at 10:11

## 2018-10-16 RX ADMIN — Medication SCH UNIT: at 06:09

## 2018-10-16 RX ADMIN — CEFTRIAXONE SODIUM SCH MLS/HR: 1 INJECTION, POWDER, FOR SOLUTION INTRAMUSCULAR; INTRAVENOUS at 10:13

## 2018-10-16 RX ADMIN — Medication PRN MLS/HR: at 02:47

## 2018-10-16 RX ADMIN — Medication SCH UNIT: at 21:36

## 2018-10-16 RX ADMIN — SENNOSIDES, DOCUSATE SODIUM SCH EACH: 50; 8.6 TABLET, FILM COATED ORAL at 10:11

## 2018-10-16 RX ADMIN — ENOXAPARIN SODIUM SCH MG: 60 INJECTION SUBCUTANEOUS at 17:19

## 2018-10-16 RX ADMIN — MIDAZOLAM HYDROCHLORIDE PRN MLS/HR: 5 INJECTION, SOLUTION INTRAMUSCULAR; INTRAVENOUS at 06:08

## 2018-10-16 RX ADMIN — CLOPIDOGREL BISULFATE SCH MG: 75 TABLET, FILM COATED ORAL at 10:12

## 2018-10-16 RX ADMIN — ENOXAPARIN SODIUM SCH MG: 60 INJECTION SUBCUTANEOUS at 06:09

## 2018-10-16 RX ADMIN — ISOSORBIDE MONONITRATE SCH MG: 30 TABLET, EXTENDED RELEASE ORAL at 10:12

## 2018-10-16 RX ADMIN — MORPHINE SULFATE PRN MG: 10 INJECTION INTRAMUSCULAR; INTRAVENOUS; SUBCUTANEOUS at 12:43

## 2018-10-16 RX ADMIN — IPRATROPIUM BROMIDE AND ALBUTEROL SULFATE SCH ML: 2.5; .5 SOLUTION RESPIRATORY (INHALATION) at 20:23

## 2018-10-16 RX ADMIN — Medication SCH ML: at 06:09

## 2018-10-16 RX ADMIN — Medication SCH ML: at 21:37

## 2018-10-16 RX ADMIN — PANTOPRAZOLE SODIUM SCH MG: 40 INJECTION, POWDER, FOR SOLUTION INTRAVENOUS at 21:36

## 2018-10-16 NOTE — PROGRESS NOTE
Provider Note


Provider Note: 





CARDIOLOGY PROGRESS NOTES by Dr. Kristin Heath on 10/16/2018.





SUBJECTIVE: The patient continues to be in atrial fibrillation, but with 

controlled ventricular response.  She is intubated and sedated.  She does not 

appear to be in any acute distress, although blood pressure slightly on the low 

side.  There is no ventricular arrhythmias seen.





PHYSICAL EXAMINATION: The patient appears to be chronically ill, and older than 

her stated age.  She is not fighting the ventilator.





 Selected Entries











  10/16/18 10/16/18





  11:53 12:00


 


Temperature  98.8 F


 


Temperature  Core





Source  


 


Pulse Rate  85


 


Respiratory  15





Rate  


 


Blood Pressure 98/57 L 


 


Blood Pressure 70 





Mean  


 


O2 Sat by Pulse  96





Oximetry  


 


Oxygen Delivery  Mechanical





Method (  Ventilator





includes room  





air)  


 


Percent of  30





Oxygen  





HEAD: Is atraumatic and normocephalic.  EYES: Pupils are equal round regular 

reactive to light.  ENT: Is negative.  SKIN: There is no skin lesions or skin 

rashes.  There is no petechia or ecchymosis.  NECK: Is supple there is no JVD, 

carotids are equal there is no bruits.  LUNGS: There is dry crackles in the 

left lower lobe.  At present there is no rales of CHF.  HEART: S1-S2 is heard 

S1 is of variable intensity.  There is no S3 gallop there is no S3 gallop the 

systolic murmur left sternal border and apex there is no rub.  ABDOMEN: Soft 

there is soft.  Bowel sounds are normal.  There is no hepatosplenomegaly.  

EXTREMITIES: Femorals are diminished.  There is no femoral bruits.  Leg pulses 

are diminished.  There is no pedal edema.  There is no DVT or cyanosis.  There 

is no clubbing.  CNS and PSYCHIATRIC not examined since the patient is 

intubated and sedated. 











  10/14/18 10/15/18 10/16/18





  09:24 13:35 05:35


 


WBC   


 


Hgb   


 


Hct   


 


Plt Count   


 


Total Counted   


 


Sodium    137.8


 


Potassium    3.3 L


 


Chloride    104


 


Carbon Dioxide    25


 


Anion Gap    9


 


BUN    26 H


 


Creatinine    1.05


 


Est GFR (Non-Af Amer)    50 L


 


Glucose    143 H


 


Calcium    8.7


 


Magnesium    2.1


 


CK-MB (CK-2)  1.28  


 


Troponin I  0.019  0.224 














  10/16/18





  05:35


 


WBC  9.4


 


Hgb  10.7 L


 


Hct  30.2 L


 


Plt Count  164


 


Total Counted  100


 


Sodium 


 


Potassium 


 


Chloride 


 


Carbon Dioxide 


 


Anion Gap 


 


BUN 


 


Creatinine 


 


Est GFR (Non-Af Amer) 


 


Glucose 


 


Calcium 


 


Magnesium 


 


CK-MB (CK-2) 


 


Troponin I 








1.  NON-ST elevation MI: Cannot be entirely excluded although the cause of the 

elevated troponin I is multifactorial due to electrolyte imbalance, respiratory 

failure with hypoxia and hypercapnia, and infection, and atrial fibrillation 

with fast ventricular response.  But since the patient is intubated and sedated

, and is not in a position to give a history of non-ST elevation MI cannot be 

entirely excluded.





2.  Acute respiratory failure with hypercapnia and hypoxemia.  This seems to be 

improving





3.  Left retrocardiac atelectasis versus pneumonia, would recommend treating 

the patient as pneumonia with antibiotics.





4.  Hypokalemia: Would recommend replacing the patient's potassium as is being 

done.





5 hypomagnesemia: Has been corrected





6.  CONGESTIVE heart failure: At present seems to be compensated.  This is 

secondary to patient's cardiomyopathy with severely depressed LV ejection 

fraction.


7.  Cardiomyopathy.


8.  Atrial fibrillation, continue current beta-blocker and ACE inhibitor for a 

cardiomyopathy later would discuss with the family regarding chronic 

anticoagulation.





9.  DNR.





Her medications have been reviewed and discussed with the caregiving providers 

on the case about the patient's management plans.  Medical decision making is 

of moderate to high complexity.  40 minutes spent on the patient with more than 

50% of time spent on direct patient care.  We will follow with you.

## 2018-10-16 NOTE — PDOC PROGRESS REPORT
Subjective


Progress Note for:: 10/16/18


Subjective:: 


Going to be going to be past medical history of COPD, hypertension, A. fib (not 

on anticoagulation) number PAD status post 2 femoral stent placement in the past

, GI bleed, bowel obstruction (status post prior colectomy and reversal of 

ileostomy), CVA (right temporal ischemic stroke), REYNALDO not compliant with CPAP 

and CAD. 





Admitted on 10/15/2018 for progressively worsening shortness of breath.  On 

arrival she was obtunded and was intubated for airway protection.  VBG in ER 

showed pH of 7.09 and PCO2 of 75.  CT chest on admission showed congestion 

bilateral pleural effusion and possible right middle lobe pneumonia.


Patient was noted to be in A. fib with RVR and was started on Cardizem drip.


Reason For Visit: 


ACUTE RESPIRATORY FAILURE








Physical Exam


Vital Signs: 


 











Temp Pulse Resp BP Pulse Ox


 


 98.1 F   82   20   105/46 L  95 


 


 10/16/18 10:00  10/16/18 10:00  10/16/18 10:00  10/16/18 10:00  10/16/18 11:44








 Intake & Output











 10/15/18 10/16/18 10/17/18





 06:59 06:59 06:59


 


Intake Total 401 1846 9


 


Output Total 1425 2294 90


 


Balance -1024 -448 -81


 


Weight 53.8 kg 53.8 kg 











General appearance: PRESENT: no acute distress


Head exam: PRESENT: atraumatic, normocephalic


Respiratory exam: PRESENT: crackles, symmetrical, other - Intubated


Cardiovascular exam: PRESENT: RRR.  ABSENT: diastolic murmur, rubs, systolic 

murmur


Pulses: PRESENT: normal dorsalis pedis pul


GI/Abdominal exam: PRESENT: normal bowel sounds, soft.  ABSENT: distended, 

guarding, mass, organolmegaly, rebound, tenderness


Neurological exam: PRESENT: awake, other - Intubated





Results


Laboratory Results: 


 





 10/16/18 05:35 





 10/16/18 05:35 





 











  10/15/18 10/16/18 10/16/18





  13:05 05:35 05:35


 


WBC   


 


RBC   


 


Hgb   


 


Hct   


 


MCV   


 


MCH   


 


MCHC   


 


RDW   


 


Plt Count   


 


Seg Neutrophils %   


 


Lymphocytes %   


 


Monocytes %   


 


Eosinophils %   


 


Basophils %   


 


Absolute Neutrophils   


 


Absolute Lymphocytes   


 


Absolute Monocytes   


 


Absolute Eosinophils   


 


Absolute Basophils   


 


Carbonic Acid   1.05 


 


HCO3/H2CO3 Ratio   23:1 


 


ABG pH   7.46 H 


 


ABG pCO2   35.0 


 


ABG pO2   65.1 L 


 


ABG HCO3   24.3 H 


 


ABG O2 Saturation   94.0 


 


ABG Base Excess   0.8 


 


FiO2   30% 


 


Sodium  137.1   137.8


 


Potassium  3.8   3.3 L


 


Chloride  101   104


 


Carbon Dioxide  25   25


 


Anion Gap  11   9


 


BUN  24 H   26 H


 


Creatinine  0.98   1.05


 


Est GFR ( Amer)  > 60   > 60


 


Est GFR (Non-Af Amer)  54 L   50 L


 


Glucose  181 H   143 H


 


Calcium  8.5   8.7


 


Magnesium  2.6 H D   2.1














  10/16/18





  05:35


 


WBC  9.4


 


RBC  3.42 L


 


Hgb  10.7 L


 


Hct  30.2 L


 


MCV  88


 


MCH  31.2


 


MCHC  35.3


 


RDW  13.8


 


Plt Count  164


 


Seg Neutrophils %  Not Reportable


 


Lymphocytes %  Not Reportable


 


Monocytes %  Not Reportable


 


Eosinophils %  Not Reportable


 


Basophils %  Not Reportable


 


Absolute Neutrophils  Not Reportable


 


Absolute Lymphocytes  Not Reportable


 


Absolute Monocytes  Not Reportable


 


Absolute Eosinophils  Not Reportable


 


Absolute Basophils  Not Reportable


 


Carbonic Acid 


 


HCO3/H2CO3 Ratio 


 


ABG pH 


 


ABG pCO2 


 


ABG pO2 


 


ABG HCO3 


 


ABG O2 Saturation 


 


ABG Base Excess 


 


FiO2 


 


Sodium 


 


Potassium 


 


Chloride 


 


Carbon Dioxide 


 


Anion Gap 


 


BUN 


 


Creatinine 


 


Est GFR ( Amer) 


 


Est GFR (Non-Af Amer) 


 


Glucose 


 


Calcium 


 


Magnesium 








 











  10/15/18





  13:35


 


Troponin I  0.224











Impressions: 


 





Chest CT  10/14/18 10:12


IMPRESSION:


1. CHF.  Cannot exclude superimposed pneumonia in the middle lobe.


2. Stable ascending thoracic aortic aneurysm.


 








Head CT  10/14/18 10:12


IMPRESSION:  CHRONIC CHANGES OF ATROPHY AND MICROVASCULAR ISCHEMIA.  NO ACUTE 

PROCESS.


EVIDENCE OF ACUTE STROKE: NO.


 








Abdomen/Pelvis CT  10/14/18 10:13


IMPRESSION:  No acute findings in the abdomen or pelvis.


 








Chest Ultrasound  10/15/18 09:05


IMPRESSION:  Trace left pleural fluid is present in the posterior costophrenic 

sulcus.  No thoracentesis was performed.


 








Chest X-Ray  10/16/18 06:00


IMPRESSION:  Minimal persistent left retrocardiac consolidation atelectasis 

versus pneumonia.


Trace left pleural effusion


 














Assessment & Plan





- Diagnosis


(1) Acute hypercapnic respiratory failure


Is this a current diagnosis for this admission?: Yes   


Plan: 


Possibly COPD exacerbation combined with pneumonia and CHF exacerbation.  

Patient is currently intubated.  BNP on admission 9440.  Leukocytosis 

improving.  Cultures have been negative except for urine culture which is 

growing gram-negative rods.  Currently on Rocephin and azithromycin.








(2) COPD exacerbation


Is this a current diagnosis for this admission?: Yes   


Plan: 


Currently intubated.  Solu-Medrol was decreased to 40 mg daily.








(3) Do not resuscitate


Is this a current diagnosis for this admission?: Yes   


Plan: 


Plan of care was discussed with family by prior attending Dr. Mancilla.  As per his 

note patient has refused any intervention for her AAA in the past.  Patient's 

family has opted for no chest compression if she rests.








(4) NSTEMI (non-ST elevated myocardial infarction)


Is this a current diagnosis for this admission?: Yes   


Plan: 


Elevated troponins.  As per cardiology note likely due to her metabolic reason 

versus ACS.  Continue anticoagulation.  Patient not a candidate for extensive 

cardiac workup as per cardiology note.  Continue beta-blockers, Ranexa, statins 

and ACE.








(5) Pneumonia


Qualifiers: 


   Pneumonia type: due to unspecified organism   Laterality: bilateral   Lung 

location: unspecified part of lung   Qualified Code(s): J18.9 - Pneumonia, 

unspecified organism   


Is this a current diagnosis for this admission?: Yes   


Plan: 


Most likely community-acquired.  Continue Rocephin and azithromycin.  Cultures 

have been negative.








(6) CHF (congestive heart failure)


Qualifiers: 


   Heart failure type: combined systolic and diastolic   Heart failure 

chronicity: acute   Qualified Code(s): I50.41 - Acute combined systolic (

congestive) and diastolic (congestive) heart failure   


Is this a current diagnosis for this admission?: Yes   


Plan: 


2D echo shows ejection fraction of 30-35%.  Continue Lasix guided by her 

vitals.  Monitor volume status.





Pleural effusion was noted on initial CT.  Chest ultrasound ordered by 

pulmonary only showed minimal pleural fluid.

## 2018-10-16 NOTE — RADIOLOGY REPORT (SQ)
EXAM DESCRIPTION:  CHEST SINGLE VIEW



COMPLETED DATE/TIME:  10/16/2018 6:53 am



REASON FOR STUDY:  resp failure



COMPARISON:  CT chest 10/14/2018

Chest films 10/15/2018, 10/14/2018



EXAM PARAMETERS:  NUMBER OF VIEWS: One view.

TECHNIQUE: Single frontal radiographic view of the chest acquired.

RADIATION DOSE: NA

LIMITATIONS: None.



FINDINGS:  LUNGS AND PLEURA: Right lung grossly clear.

On the left side, trace pleural fluid persists with minimal left retrocardiac airspace disease atelec
tasis versus pneumonia.

No pneumothorax.

MEDIASTINUM AND HILAR STRUCTURES: No masses.  Contour normal.

HEART AND VASCULAR STRUCTURES: Stable moderate cardiomegaly

BONES: No acute findings.

HARDWARE: Endotracheal tube tip 4 cm above the arash.  Nasogastric tube tip and side port in the sto
mach.  Left jugular central line tip superior vena cava.

OTHER: No other significant finding.



IMPRESSION:  Minimal persistent left retrocardiac consolidation atelectasis versus pneumonia.

Trace left pleural effusion



TECHNICAL DOCUMENTATION:  JOB ID:  [id number]

 2011 [employer]- All Rights Reserved



Reading location - IP/workstation name: [email or web address]

## 2018-10-17 LAB
ABSOLUTE LYMPHOCYTES# (MANUAL): 0.2 10^3/UL (ref 0.5–4.7)
ABSOLUTE MONOCYTES # (MANUAL): 0.3 10^3/UL (ref 0.1–1.4)
ABSOLUTE NEUTROPHILS# (MANUAL): 9.6 10^3/UL (ref 1.7–8.2)
ADD MANUAL DIFF: YES
ANION GAP SERPL CALC-SCNC: 11 MMOL/L (ref 5–19)
ANISOCYTOSIS BLD QL SMEAR: SLIGHT
ARTERIAL BLOOD FIO2: (no result)
ARTERIAL BLOOD H2CO3: 1.05 MMOL/L (ref 1.05–1.35)
ARTERIAL BLOOD HCO3: 22.7 MMOL/L (ref 20–24)
ARTERIAL BLOOD PCO2: 35 MMHG (ref 35–45)
ARTERIAL BLOOD PH: 7.43 (ref 7.35–7.45)
ARTERIAL BLOOD PO2: 75 MMHG (ref 80–100)
ARTERIAL BLOOD TOTAL CO2: 23.7 MMOL/L (ref 21–25)
BASE EXCESS BLDA CALC-SCNC: -1.2 MMOL/L
BASOPHILS NFR BLD MANUAL: 0 % (ref 0–2)
BUN SERPL-MCNC: 30 MG/DL (ref 7–20)
BURR CELLS BLD QL SMEAR: SLIGHT
CALCIUM: 8.4 MG/DL (ref 8.4–10.2)
CHLORIDE SERPL-SCNC: 105 MMOL/L (ref 98–107)
CO2 SERPL-SCNC: 22 MMOL/L (ref 22–30)
EOSINOPHIL NFR BLD MANUAL: 0 % (ref 0–6)
ERYTHROCYTE [DISTWIDTH] IN BLOOD BY AUTOMATED COUNT: 14.4 % (ref 11.5–14)
GLUCOSE SERPL-MCNC: 143 MG/DL (ref 75–110)
HCT VFR BLD CALC: 29.7 % (ref 36–47)
HGB BLD-MCNC: 10.4 G/DL (ref 12–15.5)
MCH RBC QN AUTO: 31.1 PG (ref 27–33.4)
MCHC RBC AUTO-ENTMCNC: 34.9 G/DL (ref 32–36)
MCV RBC AUTO: 89 FL (ref 80–97)
MONOCYTES % (MANUAL): 3 % (ref 3–13)
OVALOCYTES BLD QL SMEAR: SLIGHT
PLATELET # BLD: 168 10^3/UL (ref 150–450)
PLATELET COMMENT: ADEQUATE
POIKILOCYTOSIS BLD QL SMEAR: SLIGHT
POLYCHROMASIA BLD QL SMEAR: SLIGHT
POTASSIUM SERPL-SCNC: 3.7 MMOL/L (ref 3.6–5)
RBC # BLD AUTO: 3.33 10^6/UL (ref 3.72–5.28)
SAO2 % BLDA: 95.5 % (ref 94–98)
SEGMENTED NEUTROPHILS % (MAN): 95 % (ref 42–78)
SODIUM SERPL-SCNC: 137.5 MMOL/L (ref 137–145)
TOTAL CELLS COUNTED BLD: 100
VARIANT LYMPHS NFR BLD MANUAL: 2 % (ref 13–45)
WBC # BLD AUTO: 10.1 10^3/UL (ref 4–10.5)

## 2018-10-17 RX ADMIN — MORPHINE SULFATE PRN MG: 10 INJECTION INTRAMUSCULAR; INTRAVENOUS; SUBCUTANEOUS at 03:27

## 2018-10-17 RX ADMIN — CLOPIDOGREL BISULFATE SCH: 75 TABLET, FILM COATED ORAL at 10:37

## 2018-10-17 RX ADMIN — ATORVASTATIN CALCIUM SCH MG: 10 TABLET, FILM COATED ORAL at 22:13

## 2018-10-17 RX ADMIN — IPRATROPIUM BROMIDE AND ALBUTEROL SULFATE SCH ML: 2.5; .5 SOLUTION RESPIRATORY (INHALATION) at 14:22

## 2018-10-17 RX ADMIN — IPRATROPIUM BROMIDE AND ALBUTEROL SULFATE SCH ML: 2.5; .5 SOLUTION RESPIRATORY (INHALATION) at 03:01

## 2018-10-17 RX ADMIN — Medication SCH: at 06:44

## 2018-10-17 RX ADMIN — ENOXAPARIN SODIUM SCH MG: 60 INJECTION SUBCUTANEOUS at 05:40

## 2018-10-17 RX ADMIN — SODIUM CHLORIDE PRN MLS/HR: 9 INJECTION, SOLUTION INTRAVENOUS at 16:44

## 2018-10-17 RX ADMIN — METOPROLOL TARTRATE SCH MG: 50 TABLET, FILM COATED ORAL at 22:13

## 2018-10-17 RX ADMIN — Medication SCH: at 05:39

## 2018-10-17 RX ADMIN — ASPIRIN SCH MG: 81 TABLET, CHEWABLE ORAL at 09:08

## 2018-10-17 RX ADMIN — MORPHINE SULFATE PRN MG: 10 INJECTION INTRAMUSCULAR; INTRAVENOUS; SUBCUTANEOUS at 10:41

## 2018-10-17 RX ADMIN — IPRATROPIUM BROMIDE AND ALBUTEROL SULFATE SCH ML: 2.5; .5 SOLUTION RESPIRATORY (INHALATION) at 20:16

## 2018-10-17 RX ADMIN — MIDAZOLAM HYDROCHLORIDE PRN MLS/HR: 5 INJECTION, SOLUTION INTRAMUSCULAR; INTRAVENOUS at 05:39

## 2018-10-17 RX ADMIN — AZITHROMYCIN MONOHYDRATE SCH MLS/HR: 500 INJECTION, POWDER, LYOPHILIZED, FOR SOLUTION INTRAVENOUS at 10:42

## 2018-10-17 RX ADMIN — IPRATROPIUM BROMIDE AND ALBUTEROL SULFATE SCH ML: 2.5; .5 SOLUTION RESPIRATORY (INHALATION) at 08:21

## 2018-10-17 RX ADMIN — ENOXAPARIN SODIUM SCH MG: 60 INJECTION SUBCUTANEOUS at 17:07

## 2018-10-17 RX ADMIN — METOPROLOL TARTRATE SCH MG: 50 TABLET, FILM COATED ORAL at 10:42

## 2018-10-17 RX ADMIN — Medication SCH: at 14:30

## 2018-10-17 RX ADMIN — METHYLPREDNISOLONE SODIUM SUCCINATE SCH MG: 40 INJECTION, POWDER, FOR SOLUTION INTRAMUSCULAR; INTRAVENOUS at 09:08

## 2018-10-17 RX ADMIN — ASPIRIN SCH: 81 TABLET, CHEWABLE ORAL at 10:36

## 2018-10-17 RX ADMIN — CLOPIDOGREL BISULFATE SCH MG: 75 TABLET, FILM COATED ORAL at 09:08

## 2018-10-17 RX ADMIN — ISOSORBIDE MONONITRATE SCH MG: 30 TABLET, EXTENDED RELEASE ORAL at 09:08

## 2018-10-17 RX ADMIN — PANTOPRAZOLE SODIUM SCH MG: 40 INJECTION, POWDER, FOR SOLUTION INTRAVENOUS at 22:12

## 2018-10-17 RX ADMIN — CEFTRIAXONE SODIUM SCH MLS/HR: 1 INJECTION, POWDER, FOR SOLUTION INTRAMUSCULAR; INTRAVENOUS at 09:08

## 2018-10-17 RX ADMIN — Medication SCH: at 22:13

## 2018-10-17 RX ADMIN — Medication SCH: at 22:14

## 2018-10-17 RX ADMIN — SENNOSIDES, DOCUSATE SODIUM SCH EACH: 50; 8.6 TABLET, FILM COATED ORAL at 09:08

## 2018-10-17 RX ADMIN — MORPHINE SULFATE PRN MG: 10 INJECTION INTRAMUSCULAR; INTRAVENOUS; SUBCUTANEOUS at 17:06

## 2018-10-17 NOTE — RADIOLOGY REPORT (SQ)
EXAM DESCRIPTION:  CT ABD/PELVIS NO ORAL OR IV



COMPLETED DATE/TIME:  10/17/2018 1:25 pm



REASON FOR STUDY:  abd distention



COMPARISON:  2014.



TECHNIQUE:  CT scan of the abdomen and pelvis performed without intravenous or oral contrast. Images 
reviewed with lung, soft tissue, and bone windows. Reconstructed coronal and sagittal MPR images revi
ewed. All images stored on PACS.

All CT scanners at this facility use dose modulation, iterative reconstruction, and/or weight based d
osing when appropriate to reduce radiation dose to as low as reasonably achievable (ALARA).

[facility]: Dose Right  [facility]: CareDose    [facility]: Dose Right    [facility]: Teradose 4D    [facility]: Smart Technologies



RADIATION DOSE:  mGy.



LIMITATIONS:  None.



FINDINGS:  LOWER CHEST: 5.4 cm ascending aortic aneurysm.  Cardiomegaly.  Small pleural effusions and
 dependent airspace disease bilaterally.  Nasogastric tube extending into the stomach.

NON-CONTRASTED LIVER, SPLEEN, ADRENALS: Evaluation limited by lack of IV contrast. No identified sign
ificant masses.

PANCREAS: No masses. No peripancreatic inflammatory changes.

GALLBLADDER: No identified stones by CT criteria. No inflammatory changes to suggest cholecystitis.

RIGHT KIDNEY AND URETER: No suspicious masses. Assessment limited by lack of IV contrast.   No signif
icant calcifications.   No hydronephrosis or hydroureter.

LEFT KIDNEY AND URETER: No suspicious masses. Assessment limited by lack of IV contrast.   No signifi
cant calcifications.   No hydronephrosis or hydroureter.

AORTA AND RETROPERITONEUM: No aneurysm. No retroperitoneal masses or adenopathy.

BOWEL AND PERITONEAL CAVITY: Fecal material within nondilated cecum.  There is mild dilatation of the
 ascending colon extending to splenic flexure.  The descending colon is decompressed.  No free air.  
No ascites.

APPENDIX: Not visualized.

PELVIS, BLADDER, AND ABDOMINAL WALL:Schwartz catheter in urinary bladder.  Small amount of free fluid.

BONES: No acute findings.

OTHER: No other significant finding.



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  No high-grade
 obstruction.  No fecal impaction.



COMMENT:  Quality ID # [id number]: Final reports with documentation of one or more dose reduction techniques
 (e.g., Automated exposure control, adjustment of the mA and/or kV according to patient size, use of 
iterative reconstruction technique)



TECHNICAL DOCUMENTATION:  JOB ID:  [id number]

 [employer]- All Rights Reserved



Reading location - IP/workstation name: [email or web address]

## 2018-10-17 NOTE — RADIOLOGY REPORT (SQ)
EXAM DESCRIPTION:  KUB/ABDOMEN (SINGLE VIEW)



COMPLETED DATE/TIME:  10/17/2018 10:38 am



REASON FOR STUDY:  abd distension



COMPARISON:  7/16/2014



NUMBER OF VIEWS:  Two views.



TECHNIQUE:  Supine and erect/decubitus radiographic images of the abdomen acquired.



LIMITATIONS:  None.



FINDINGS:  FREE AIR: None. No abnormal gas collections.

LUNG BASES: Left pleural effusion.

BOWEL GAS PATTERN: Abundant fecal material.  Mild dilatation of the transverse colon and splenic flex
ure.  Cecum is not dilated.

CALCIFICATIONS: No suspicious calcifications.

SOFT TISSUES: No gross mass or suggestion of organomegaly.

HARDWARE: None in the abdomen.

BONES: No acute fracture. No worrisome bone lesions.

OTHER: Schwartz catheter.  Nasogastric tube tip in the stomach.



IMPRESSION:  Fecal retention.  Cannot exclude partial colonic obstruction.



TECHNICAL DOCUMENTATION:  JOB ID:  [id number]

 2011 [employer]- All Rights Reserved



Reading location - IP/workstation name: [email or web address]

## 2018-10-17 NOTE — PDOC PROGRESS REPORT
Subjective


Progress Note for:: 10/16/18


Subjective:: 


Intubated and sedated


Reason For Visit: 


ACUTE RESPIRATORY FAILURE








Physical Exam


Vital Signs: 


 











Temp Pulse Resp BP Pulse Ox


 


 97.5 F   83   16   106/45 L  92 


 


 10/16/18 07:42  10/16/18 08:16  10/16/18 08:16  10/16/18 07:42  10/16/18 08:30








 Intake & Output











 10/15/18 10/16/18 10/17/18





 06:59 06:59 06:59


 


Intake Total 401 1546 8


 


Output Total 1425 2294 40


 


Balance -1024 -748 -32


 


Weight 53.8 kg 53.8 kg 











General appearance: PRESENT: no acute distress, disheveled, thin, well-developed

, well-nourished


Head exam: PRESENT: atraumatic, normocephalic


Eye exam: PRESENT: conjunctiva pale.  ABSENT: nystagmus, periorbital swelling, 

scleral icterus


Mouth exam: PRESENT: dry mucosa, neck supple, tongue midline, other - ET tube 

in place


Neck exam: ABSENT: carotid bruit, JVD, lymphadenopathy, thyromegaly, tracheal 

deviation, tracheostomy


Respiratory exam: PRESENT: decreased breath sounds, prolonged expiratory phas, 

rhonchi, symmetrical, unlabored.  ABSENT: retraction, stridor, tachypnea


Cardiovascular exam: PRESENT: RRR, +S1, +S2


Pulses: PRESENT: normal radial pulses


GI/Abdominal exam: PRESENT: soft.  ABSENT: tenderness


Extremities exam: ABSENT: calf tenderness, clubbing, joint swelling


Musculoskeletal exam: PRESENT: deformity, dislocation


Neurological exam: ABSENT: awake


Skin exam: PRESENT: dry, warm





Results


Laboratory Results: 


 





 10/16/18 05:35 





 10/16/18 05:35 





 











  10/15/18 10/16/18 10/16/18





  13:05 05:35 05:35


 


WBC   


 


RBC   


 


Hgb   


 


Hct   


 


MCV   


 


MCH   


 


MCHC   


 


RDW   


 


Plt Count   


 


Seg Neutrophils %   


 


Lymphocytes %   


 


Monocytes %   


 


Eosinophils %   


 


Basophils %   


 


Absolute Neutrophils   


 


Absolute Lymphocytes   


 


Absolute Monocytes   


 


Absolute Eosinophils   


 


Absolute Basophils   


 


Carbonic Acid   1.05 


 


HCO3/H2CO3 Ratio   23:1 


 


ABG pH   7.46 H 


 


ABG pCO2   35.0 


 


ABG pO2   65.1 L 


 


ABG HCO3   24.3 H 


 


ABG O2 Saturation   94.0 


 


ABG Base Excess   0.8 


 


FiO2   30% 


 


Sodium  137.1   137.8


 


Potassium  3.8   3.3 L


 


Chloride  101   104


 


Carbon Dioxide  25   25


 


Anion Gap  11   9


 


BUN  24 H   26 H


 


Creatinine  0.98   1.05


 


Est GFR ( Amer)  > 60   > 60


 


Est GFR (Non-Af Amer)  54 L   50 L


 


Glucose  181 H   143 H


 


Calcium  8.5   8.7


 


Magnesium  2.6 H D   2.1














  10/16/18





  05:35


 


WBC  9.4


 


RBC  3.42 L


 


Hgb  10.7 L


 


Hct  30.2 L


 


MCV  88


 


MCH  31.2


 


MCHC  35.3


 


RDW  13.8


 


Plt Count  164


 


Seg Neutrophils %  Not Reportable


 


Lymphocytes %  Not Reportable


 


Monocytes %  Not Reportable


 


Eosinophils %  Not Reportable


 


Basophils %  Not Reportable


 


Absolute Neutrophils  Not Reportable


 


Absolute Lymphocytes  Not Reportable


 


Absolute Monocytes  Not Reportable


 


Absolute Eosinophils  Not Reportable


 


Absolute Basophils  Not Reportable


 


Carbonic Acid 


 


HCO3/H2CO3 Ratio 


 


ABG pH 


 


ABG pCO2 


 


ABG pO2 


 


ABG HCO3 


 


ABG O2 Saturation 


 


ABG Base Excess 


 


FiO2 


 


Sodium 


 


Potassium 


 


Chloride 


 


Carbon Dioxide 


 


Anion Gap 


 


BUN 


 


Creatinine 


 


Est GFR ( Amer) 


 


Est GFR (Non-Af Amer) 


 


Glucose 


 


Calcium 


 


Magnesium 








 











  10/15/18





  13:35


 


Troponin I  0.224











Impressions: 


 





Chest CT  10/14/18 10:12


IMPRESSION:


1. CHF.  Cannot exclude superimposed pneumonia in the middle lobe.


2. Stable ascending thoracic aortic aneurysm.


 








Head CT  10/14/18 10:12


IMPRESSION:  CHRONIC CHANGES OF ATROPHY AND MICROVASCULAR ISCHEMIA.  NO ACUTE 

PROCESS.


EVIDENCE OF ACUTE STROKE: NO.


 








Abdomen/Pelvis CT  10/14/18 10:13


IMPRESSION:  No acute findings in the abdomen or pelvis.


 








Chest Ultrasound  10/15/18 09:05


IMPRESSION:  Trace left pleural fluid is present in the posterior costophrenic 

sulcus.  No thoracentesis was performed.


 








Chest X-Ray  10/16/18 06:00


IMPRESSION:  Minimal persistent left retrocardiac consolidation atelectasis 

versus pneumonia.


Trace left pleural effusion


 














Assessment & Plan





- Diagnosis


(1) Pleural effusion


Is this a current diagnosis for this admission?: No   





(2) Acute hypercapnic respiratory failure


Is this a current diagnosis for this admission?: Yes   


Plan: 


Improving








(3) COPD exacerbation


Is this a current diagnosis for this admission?: Yes   


Plan: 


Improving








(4) Pneumonia


Qualifiers: 


   Pneumonia type: due to unspecified organism   Laterality: bilateral   Lung 

location: unspecified part of lung   Qualified Code(s): J18.9 - Pneumonia, 

unspecified organism   


Is this a current diagnosis for this admission?: Yes   


Plan: 


Treat for community-acquired pneumonia








- Time


Total Critical Time (Minutes): 40

## 2018-10-17 NOTE — RADIOLOGY REPORT (SQ)
EXAM DESCRIPTION: X-ray single view chest.



CLINICAL HISTORY: 85 years Female, resp failure/pna



COMPARISON: Portable chest x-rays performed on 10/16/2018 and

10/15/2018.



TECHNIQUE: Single portable view of the chest performed on

10/17/2018 at 6:11 AM



FINDINGS: 

The lungs are well expanded. There is ongoing volume loss in the

left inferior hemithorax likely due to a combination of pleural

fluid and atelectasis. Underlying inflammatory changes are not

excluded. There are probable atelectatic changes in the right

inferior hemithorax. There is no evidence of a pneumothorax.



The cardiac silhouette is stable and enlarged. There are

atherosclerotic calcifications along the thoracic aorta.



The mediastinal contours are normal.



No acute osseous abnormality is identified.



No focal soft tissue abnormalities are seen.



Lines and tubes:  The endotracheal tube, left IJ central venous

catheter and feeding tube are grossly stable.



IMPRESSION:



1. No significant change when compared to the prior studies.

2. Persistent volume loss in the left inferior hemithorax and

stable cardiomegaly.

3. Grossly stable life support lines and tubes.

## 2018-10-17 NOTE — PDOC PROGRESS REPORT
Subjective


Progress Note for:: 10/17/18


Subjective:: 


Going to be going to be past medical history of COPD, hypertension, A. fib (not 

on anticoagulation) number PAD status post 2 femoral stent placement in the past

, GI bleed, bowel obstruction (status post prior colectomy and reversal of 

ileostomy), CVA (right temporal ischemic stroke), REYNALDO not compliant with CPAP 

and CAD. 





10/16/2018.  She was admitted on 10/15/2018 for progressively worsening 

shortness of breath.  On arrival she was obtunded and was intubated for airway 

protection.  VBG in ER showed pH of 7.09 and PCO2 of 75.  CT chest on admission 

showed congestion bilateral pleural effusion and possible right middle lobe 

pneumonia. Patient was noted to be in A. fib with RVR and was started on 

Cardizem drip.





10/17/2018.  Acute events overnight.  On my encounter patient was sleeping but 

arousable.  Patient is not intubated but able to communicate by shaking her 

head.  When asked if she is in pain she is a firm and by shaking her head.  She 

is able to follow command.  When asked if she is hurting in her abdomen she 

signaling yes. 


Reason For Visit: 


ACUTE RESPIRATORY FAILURE








Physical Exam


Vital Signs: 


 











Temp Pulse Resp BP Pulse Ox


 


 97.7 F   72   15   117/54 L  99 


 


 10/17/18 12:00  10/17/18 12:00  10/17/18 12:00  10/17/18 12:00  10/17/18 12:00








 Intake & Output











 10/16/18 10/17/18 10/18/18





 06:59 06:59 06:59


 


Intake Total 1846 1375 10


 


Output Total 2294 610 240


 


Balance -448 765 -230


 


Weight 53.8 kg 56.4 kg 














Results


Laboratory Results: 


 





 10/17/18 04:25 





 10/17/18 04:25 





 











  10/17/18 10/17/18 10/17/18





  04:25 04:25 04:25


 


WBC    10.1


 


RBC    3.33 L


 


Hgb    10.4 L


 


Hct    29.7 L


 


MCV    89


 


MCH    31.1


 


MCHC    34.9


 


RDW    14.4 H


 


Plt Count    168


 


Seg Neutrophils %    Not Reportable


 


Lymphocytes %    Not Reportable


 


Monocytes %    Not Reportable


 


Eosinophils %    Not Reportable


 


Basophils %    Not Reportable


 


Absolute Neutrophils    Not Reportable


 


Absolute Lymphocytes    Not Reportable


 


Absolute Monocytes    Not Reportable


 


Absolute Eosinophils    Not Reportable


 


Absolute Basophils    Not Reportable


 


Carbonic Acid  1.05  


 


HCO3/H2CO3 Ratio  21:1  


 


ABG pH  7.43  


 


ABG pCO2  35.0  


 


ABG pO2  75.0 L  


 


ABG HCO3  22.7  


 


ABG O2 Saturation  95.5  


 


ABG Base Excess  -1.2  


 


FiO2  40%  


 


Sodium   137.5 


 


Potassium   3.7 


 


Chloride   105 


 


Carbon Dioxide   22 


 


Anion Gap   11 


 


BUN   30 H 


 


Creatinine   1.15 


 


Est GFR ( Amer)   54 L 


 


Est GFR (Non-Af Amer)   45 L 


 


Glucose   143 H 


 


Calcium   8.4 


 


Magnesium   1.9 








 





10/14/18 14:20   Sputum   Gram Stain - Final


10/14/18 14:20   Sputum   Sputum Culture - Final


                            Streptococcus Pneumoniae


                            Aspergillus Species


                            Normal Meenakshi





 











  10/15/18





  13:35


 


Troponin I  0.224











Impressions: 


 





Chest CT  10/14/18 10:12


IMPRESSION:


1. CHF.  Cannot exclude superimposed pneumonia in the middle lobe.


2. Stable ascending thoracic aortic aneurysm.


 








Head CT  10/14/18 10:12


IMPRESSION:  CHRONIC CHANGES OF ATROPHY AND MICROVASCULAR ISCHEMIA.  NO ACUTE 

PROCESS.


EVIDENCE OF ACUTE STROKE: NO.


 








Abdomen/Pelvis CT  10/14/18 10:13


IMPRESSION:  No acute findings in the abdomen or pelvis.


 








Chest Ultrasound  10/15/18 09:05


IMPRESSION:  Trace left pleural fluid is present in the posterior costophrenic 

sulcus.  No thoracentesis was performed.


 








KUB X-Ray  10/17/18 00:00


IMPRESSION:  Fecal retention.  Cannot exclude partial colonic obstruction.


 








Chest X-Ray  10/17/18 06:00


IMPRESSION:


 


1. No significant change when compared to the prior studies.


2. Persistent volume loss in the left inferior hemithorax and


stable cardiomegaly.


3. Grossly stable life support lines and tubes.


 














Assessment & Plan





- Diagnosis


(1) Acute hypercapnic respiratory failure


Is this a current diagnosis for this admission?: Yes   


Plan: 


Improving.  Possibly COPD exacerbation combined with pneumonia and CHF 

exacerbation.  Patient is currently intubated.  BNP on admission 9440.  

Leukocytosis improving.  Sputum culture positive for strep pneumo pansensitive.

  She azithromycin continue IV ceftriaxone.  Switch to p.o. medication when 

patient is extubated.








(2) COPD exacerbation


Is this a current diagnosis for this admission?: Yes   


Plan: 


Currently intubated.  Solu-Medrol was decreased to 40 mg daily.








(3) Do not resuscitate


Is this a current diagnosis for this admission?: Yes   


Plan: 


Plan of care was discussed with family by prior attending Dr. Mancilla.  As per his 

note patient has refused any intervention for her AAA in the past.  Patient's 

family has opted for no chest compression if she rests.








(4) NSTEMI (non-ST elevated myocardial infarction)


Is this a current diagnosis for this admission?: Yes   


Plan: 


Elevated troponins.  As per cardiology note likely due to her metabolic reason 

versus ACS.  Continue anticoagulation.  Patient not a candidate for extensive 

cardiac workup as per cardiology note.  Continue beta-blockers, Ranexa, statins 

and ACE.








(5) Pneumonia


Qualifiers: 


   Pneumonia type: due to unspecified organism   Laterality: bilateral   Lung 

location: unspecified part of lung   Qualified Code(s): J18.9 - Pneumonia, 

unspecified organism   


Is this a current diagnosis for this admission?: Yes   


Plan: 


Most likely community-acquired.  Sputum culture positive for strep pneumo 

pansensitive.  Continue Rocephin DC azithromycin.  Switch to oral antibiotics 

once patient is extubated and able to take p.o. medication. 








(6) CHF (congestive heart failure)


Qualifiers: 


   Heart failure type: combined systolic and diastolic   Heart failure 

chronicity: acute   Qualified Code(s): I50.41 - Acute combined systolic (

congestive) and diastolic (congestive) heart failure   


Is this a current diagnosis for this admission?: Yes   


Plan: 


2D echo shows ejection fraction of 30-35%.  Continue Lasix guided by her 

vitals.  Monitor volume status.





Pleural effusion was noted on initial CT.  Chest ultrasound ordered by 

pulmonary only showed minimal pleural fluid.








(7) Afib


Is this a current diagnosis for this admission?: No   


Plan: 


Rate controlled.  Patient was on Cardizem drip on admission.  DC Cardizem drip 

and restart beta-blockers.  As per daughter patient is not on chronic 

anticoagulation due to GI bleeding caused by anticoagulation treatment in the 

past.








(8) Constipation


Is this a current diagnosis for this admission?: Yes   


Plan: 


Patient has history of chronic constipation.  As per daughter she has had 

abdominal surgery for small bowel obstruction in the past.  A CT abdomen was 

done which was indicative of partial obstruction or pseudoobstruction surgery 

was consulted for recommendation and they stated that at this is not an 

obstruction.  To continue monitoring her if does not improve then may consider 

compressive colonoscopy.  She is too high risk for any intervention.

## 2018-10-17 NOTE — PDOC CONSULTATION
Consultation


Consult Date: 10/17/18


Consult reason:: Abdominal distention





History of Present Illness


Admission Date/PCP: 


  10/14/18 12:17





  MONIKA DAMON MD





Patient complains of: Abdominal distention


History of Present Illness: 


REUBEN SALINAS is a 85 year old female seen at the request of the hospitalist 

service.  The patient was admitted for respiratory distress, CHF exacerbation, 

possible pneumonia.  Patient has had progressive distention of her abdomen over 

the last 72 hours.  Patient had a CT scan on admission showing no sign of 

obstruction.  The patient underwent CT scan today showing distention of the 

entire colon with a question of pseudoobstruction versus obstruction.  

Currently the patient is on the ventilator in critical condition.  The patient 

is unable to relay any portion of her medical history due to her ventilator 

status.  The nurse states that the patient is having liquid bowel movements.  

All medical history is obtained from the nursing staff and the medical record.








Past Medical History


Cardiac Medical History: Reports: Atrial Fibrillation, Congestive Heart Failure

, Coronary Artery Disease, Myocardial Infarction, Hyperlipidema, Hypertension, 

Peripheral Vascular Disease


Pulmonary Medical History: Reports: Asthma, Pneumonia


   Denies: Tuberculosis


Neurological Medical History: 


   Denies: Seizures


Musculoskeltal Medical History: Reports: Arthritis





Past Surgical History


Past Surgical History: Reports: Cardiac Catheterization - 3 stent, Coronary 

Stent, Tonsillectomy, Other - Colon resection (likely left hemicolectomy)


   Denies: Pacemaker





Social History


Smoking Status: Unknown if Ever Smoked


Frequency of Alcohol Use: Rare


Hx Recreational Drug Use: No


Hx Prescription Drug Abuse: No





- Advance Directive


Resuscitation Status: Do Not Resuscitate





Family History


Family History: Reviewed & Not Pertinent - She has been


Parental Family History Reviewed: Yes


Children Family History Reviewed: Yes


Sibling(s) Family History Reviewed.: Yes





Medication/Allergy


Home Medications: 








Albuterol Sulfate [Ventolin HFA MDI 18 GM] 2 puff IH Q4HP PRN 10/14/18 


Budesonide/Formoterol Fumarate [Symbicort -4.5 mcg Inhaler 6 gm] 2 puff 

IH Q12 10/14/18 


Clonidine [Catapres-Tts 1 (0.1 mg/24 Hr) Transderm Patch] 1 patch TOP ACKERMAN@1000 10

/14/18 


Furosemide [Lasix 20 mg Tablet] 20 mg PO DAILY 10/14/18 


Hydrocodone/Acetaminophen [Norco  mg Tablet] 1 tab PO Q6HP PRN 10/14/18 


Isosorbide Mononitrate [Imdur 30 mg Tablet.er] 30 mg PO DAILY 10/14/18 


Losartan/Hydrochlorothiazide [Hyzaar 100-12.5 Tablet] 1 tab PO DAILY 10/14/18 


Metoprolol Succinate [Toprol Xl 50 mg Tab.sr] 50 mg PO DAILY 10/14/18 


Nitroglycerin [Nitrostat 0.4 mg (1/150 Gr) Tabs 25/Bottle] 0.4 mg SL Q5MP PRN 10

/14/18 


Pantoprazole Sodium [Protonix] 40 mg PO DAILY 10/14/18 


Pravastatin Sodium [Pravachol] 40 mg PO QHS 10/14/18 


Prednisone [Prednisone] 2.5 mg PO DAILY 10/15/18 








Allergies/Adverse Reactions: 


 





No Known Allergies Allergy (Verified 02/17/18 11:55)


 











Review of Systems


ROS unobtainable: Due to endotracheal tube, Due to mental status





Physical Exam


Vital Signs: 


 











Temp Pulse Resp BP Pulse Ox


 


 98.4 F   85   16   120/53 L  94 


 


 10/17/18 16:00  10/17/18 16:00  10/17/18 16:00  10/17/18 16:00  10/17/18 16:15








 Intake & Output











 10/16/18 10/17/18 10/18/18





 06:59 06:59 06:59


 


Intake Total 1846 1375 1106


 


Output Total 2294 610 320


 


Balance -448 765 786


 


Weight 53.8 kg 56.4 kg 











General appearance: PRESENT: no acute distress


Head exam: PRESENT: atraumatic, normocephalic


Eye exam: PRESENT: PERRLA


Mouth exam: PRESENT: moist, neck supple


Neck exam: ABSENT: meningismus, tenderness, thyromegaly, tracheal deviation


Respiratory exam: PRESENT: crackles, rhonchi, other - Endotracheal intubation.


Cardiovascular exam: PRESENT: irregular rhythm


Pulses: PRESENT: normal radial pulses


Vascular exam: PRESENT: normal capillary refill


GI/Abdominal exam: PRESENT: distended, soft, tenderness - No apparent 

tenderness.  ABSENT: guarding, rebound, rigid


Rectal exam: PRESENT: normal rectal tone


Extremities exam: ABSENT: clubbing


Musculoskeletal exam: ABSENT: deformity


Neurological exam: PRESENT: other - We will not follow commands..  ABSENT: alert

, awake


Skin exam: ABSENT: cyanosis, erythema, jaundice





Results


Laboratory Results: 


 





 10/17/18 04:25 





 10/17/18 04:25 





 











  10/17/18 10/17/18 10/17/18





  04:25 04:25 04:25


 


WBC    10.1


 


RBC    3.33 L


 


Hgb    10.4 L


 


Hct    29.7 L


 


MCV    89


 


MCH    31.1


 


MCHC    34.9


 


RDW    14.4 H


 


Plt Count    168


 


Seg Neutrophils %    Not Reportable


 


Lymphocytes %    Not Reportable


 


Monocytes %    Not Reportable


 


Eosinophils %    Not Reportable


 


Basophils %    Not Reportable


 


Absolute Neutrophils    Not Reportable


 


Absolute Lymphocytes    Not Reportable


 


Absolute Monocytes    Not Reportable


 


Absolute Eosinophils    Not Reportable


 


Absolute Basophils    Not Reportable


 


Carbonic Acid  1.05  


 


HCO3/H2CO3 Ratio  21:1  


 


ABG pH  7.43  


 


ABG pCO2  35.0  


 


ABG pO2  75.0 L  


 


ABG HCO3  22.7  


 


ABG O2 Saturation  95.5  


 


ABG Base Excess  -1.2  


 


FiO2  40%  


 


Sodium   137.5 


 


Potassium   3.7 


 


Chloride   105 


 


Carbon Dioxide   22 


 


Anion Gap   11 


 


BUN   30 H 


 


Creatinine   1.15 


 


Est GFR ( Amer)   54 L 


 


Est GFR (Non-Af Amer)   45 L 


 


Glucose   143 H 


 


Calcium   8.4 


 


Magnesium   1.9 








 





10/14/18 14:20   Sputum   Gram Stain - Final


10/14/18 14:20   Sputum   Sputum Culture - Final


                            Streptococcus Pneumoniae


                            Aspergillus Species


                            Normal Meenakshi





 











  10/15/18





  13:35


 


Troponin I  0.224











Impressions: 


 





Chest CT  10/14/18 10:12


IMPRESSION:


1. CHF.  Cannot exclude superimposed pneumonia in the middle lobe.


2. Stable ascending thoracic aortic aneurysm.


 








Head CT  10/14/18 10:12


IMPRESSION:  CHRONIC CHANGES OF ATROPHY AND MICROVASCULAR ISCHEMIA.  NO ACUTE 

PROCESS.


EVIDENCE OF ACUTE STROKE: NO.


 








Chest Ultrasound  10/15/18 09:05


IMPRESSION:  Trace left pleural fluid is present in the posterior costophrenic 

sulcus.  No thoracentesis was performed.


 








Abdomen/Pelvis CT  10/17/18 00:00


IMPRESSION:  Partial colonic obstruction versus pseudo-obstruction Htu's 

syndrome.  No high-grade obstruction.  No fecal impaction.


 








KUB X-Ray  10/17/18 00:00


IMPRESSION:  Fecal retention.  Cannot exclude partial colonic obstruction.


 








Chest X-Ray  10/17/18 06:00


IMPRESSION:


 


1. No significant change when compared to the prior studies.


2. Persistent volume loss in the left inferior hemithorax and


stable cardiomegaly.


3. Grossly stable life support lines and tubes.


 














Assessment & Plan





- Diagnosis


(1) Abdominal distention


Is this a current diagnosis for this admission?: Yes   





- Plan Summary


Plan Summary: 





This is a critically ill 85-year-old female with abdominal distention.  Her CT 

scan today shows diffuse distention of the colon down to the level of her 

colorectal anastomosis.  Her CT scan from 3 days ago was completely normal.  

The likelihood of developing a colonic obstruction at her anastomosis in 3 days 

is remote.  A more likely explanation of her distention is [place]'s syndrome.  

Currently, her colon measures at 6-7 cm in transverse diameter.  I recommend 

monitoring the colonic distention and supportive care.  If the distention 

reaches 10 cm, she may require decompressive colonoscopy.  Of note, the patient 

does not have any small bowel distention (there is no sign of small bowel 

obstruction).  The patient has no colonic wall thickening or sign of active 

colonic inflammation.  She has had several loose, mucus filled bowel movements.

  I will send a sample for C. difficile analysis.  We will continue to follow 

this patient closely with you.  The patient does not have evidence of an acute 

abdomen, and does not require urgent/emergent surgical intervention at this 

time.  Repeat abdominal x-rays tomorrow.

## 2018-10-17 NOTE — PROGRESS NOTE
Provider Note


Provider Note: 





CARDIOLOGY PROGRESS NOTES by Dr. Kristin Heath on 10/17/2018.





SUBJECTIVE: The patient continues to be in atrial fibrillation, but with 

controlled ventricular response.  She is intubated and sedated.  She does not 

appear to be in any acute distress, although blood pressure slightly on the low 

side.  There is no ventricular arrhythmias seen.





PHYSICAL EXAMINATION: The patient appears to be chronically ill, and older than 

her stated age.  She is not fighting the ventilator.





 Selected Entries











  10/17/18 10/17/18 10/17/18





  14:23 14:48 15:00


 


Core  98.8 F 





Temperature   


 


Heart Rate (   94





Monitors)   


 


Respiratory 18  17





Rate   


 


Respiratory Normal  





Depth   


 


Respiratory Mechanically  





Effort Ventilated  


 


Respiratory Normal  





Pattern   


 


Blood Pressure  143/58 H 


 


Blood Pressure  86 





Mean   


 


O2 Sat by Pulse 96  95





Oximetry   


 


Fraction of 30  





Inspired Oxygen   





(FIO2)   





HEAD: Is atraumatic and normocephalic.  EYES: Pupils are equal round regular 

reactive to light.  ENT: Is negative.  SKIN: There is no skin lesions or skin 

rashes.  There is no petechia or ecchymosis.  NECK: Is supple there is no JVD, 

carotids are equal there is no bruits.  LUNGS: There is dry crackles in the 

left lower lobe.  At present there is no rales of CHF.  HEART: S1-S2 is heard 

S1 is of variable intensity.  There is no S3 gallop there is no S3 gallop the 

systolic murmur left sternal border and apex there is no rub.  ABDOMEN: Soft 

there is soft.  Bowel sounds are normal.  There is no hepatosplenomegaly.  

EXTREMITIES: Femorals are diminished.  There is no femoral bruits.  Leg pulses 

are diminished.  There is no pedal edema.  There is no DVT or cyanosis.  There 

is no clubbing.  CNS and PSYCHIATRIC not examined since the patient is 

intubated and sedated.   











  10/17/18 10/17/18 10/17/18





  04:25 04:25 04:25


 


WBC    10.1


 


Hgb    10.4 L


 


Hct    29.7 L


 


Plt Count    168


 


Carbonic Acid  1.05  


 


HCO3/H2CO3 Ratio  21:1  


 


ABG pH  7.43  


 


ABG pCO2  35.0  


 


ABG pO2  75.0 L  


 


ABG HCO3  22.7  


 


ABG Total CO2  23.7  


 


ABG O2 Saturation  95.5  


 


ABG Base Excess  -1.2  


 


FiO2  40%  


 


Sodium   137.5 


 


Potassium   3.7 


 


Chloride   105 


 


Carbon Dioxide   22 


 


Anion Gap   11 


 


BUN   30 H 


 


Creatinine   1.15 


 


Est GFR (Non-Af Amer)   45 L 


 


Glucose   143 H 


 


Calcium   8.4 


 


Magnesium   1.9 











1.  NON-ST elevation MI: Cannot be entirely excluded although the cause of the 

elevated troponin I is multifactorial due to electrolyte imbalance, respiratory 

failure with hypoxia and hypercapnia, and infection, and atrial fibrillation 

with fast ventricular response.  But since the patient is intubated and sedated

, and is not in a position to give a history of non-ST elevation MI cannot be 

entirely excluded.





2.  Acute respiratory failure with hypercapnia and hypoxemia.  This seems to be 

improving





3.  Left retrocardiac atelectasis versus pneumonia, would recommend treating 

the patient as pneumonia with antibiotics.





4.  Hypokalemia: Would recommend replacing the patient's potassium as is being 

done.





5 hypomagnesemia: Has been corrected





6.  CONGESTIVE heart failure: At present seems to be compensated.  This is 

secondary to patient's cardiomyopathy with severely depressed LV ejection 

fraction.


7.  Cardiomyopathy.


8.  Atrial fibrillation, continue current beta-blocker and ACE inhibitor for a 

cardiomyopathy later would discuss with the family regarding chronic 

anticoagulation.  We will stop the Cardizem drip and switch to oral medication 

for rate control.





9.  DNR.


Her medications have been reviewed and discussed with the caregiving providers 

on the case about the patient's management plans.  Medical decision making is 

of moderate to high complexity.  40 minutes spent on the patient with more than 

50% of time spent on direct patient care.  We will follow with you.

## 2018-10-17 NOTE — PDOC PROGRESS REPORT
Subjective


Progress Note for:: 10/17/18


Subjective:: 





extubate


Reason For Visit: 


ACUTE RESPIRATORY FAILURE








Physical Exam


Vital Signs: 


 











Temp Pulse Resp BP Pulse Ox


 


 97.7 F   77   10 L  132/59 H  92 


 


 10/17/18 08:00  10/17/18 08:24  10/17/18 08:24  10/17/18 08:00  10/17/18 08:24








 Intake & Output











 10/16/18 10/17/18 10/18/18





 06:59 06:59 06:59


 


Intake Total 1846 1075 10


 


Output Total 2294 610 120


 


Balance -448 465 -110


 


Weight 53.8 kg 56.4 kg 











General appearance: PRESENT: no acute distress, disheveled, thin, well-developed

, well-nourished


Head exam: PRESENT: atraumatic, normocephalic


Eye exam: PRESENT: conjunctiva pale.  ABSENT: nystagmus, periorbital swelling, 

scleral icterus


Mouth exam: PRESENT: dry mucosa, neck supple, tongue midline, other - ET tube 

intact


Neck exam: ABSENT: carotid bruit, JVD, lymphadenopathy, thyromegaly, tracheal 

deviation, tracheostomy


Respiratory exam: PRESENT: decreased breath sounds, prolonged expiratory phas, 

rhonchi, symmetrical, unlabored.  ABSENT: retraction, stridor, tachypnea


Cardiovascular exam: PRESENT: RRR, +S1, +S2


Pulses: PRESENT: normal radial pulses


GI/Abdominal exam: PRESENT: soft.  ABSENT: tenderness


Gentrourinary exam: PRESENT: indwelling catheter


Extremities exam: ABSENT: calf tenderness, clubbing, joint swelling


Musculoskeletal exam: ABSENT: deformity, dislocation


Neurological exam: PRESENT: awake


Psychiatric exam: PRESENT: flat affect


Skin exam: PRESENT: dry, warm





Results


Laboratory Results: 


 





 10/17/18 04:25 





 10/17/18 04:25 





 











  10/17/18 10/17/18 10/17/18





  04:25 04:25 04:25


 


WBC    10.1


 


RBC    3.33 L


 


Hgb    10.4 L


 


Hct    29.7 L


 


MCV    89


 


MCH    31.1


 


MCHC    34.9


 


RDW    14.4 H


 


Plt Count    168


 


Seg Neutrophils %    Not Reportable


 


Lymphocytes %    Not Reportable


 


Monocytes %    Not Reportable


 


Eosinophils %    Not Reportable


 


Basophils %    Not Reportable


 


Absolute Neutrophils    Not Reportable


 


Absolute Lymphocytes    Not Reportable


 


Absolute Monocytes    Not Reportable


 


Absolute Eosinophils    Not Reportable


 


Absolute Basophils    Not Reportable


 


Carbonic Acid  1.05  


 


HCO3/H2CO3 Ratio  21:1  


 


ABG pH  7.43  


 


ABG pCO2  35.0  


 


ABG pO2  75.0 L  


 


ABG HCO3  22.7  


 


ABG O2 Saturation  95.5  


 


ABG Base Excess  -1.2  


 


FiO2  40%  


 


Sodium   137.5 


 


Potassium   3.7 


 


Chloride   105 


 


Carbon Dioxide   22 


 


Anion Gap   11 


 


BUN   30 H 


 


Creatinine   1.15 


 


Est GFR ( Amer)   54 L 


 


Est GFR (Non-Af Amer)   45 L 


 


Glucose   143 H 


 


Calcium   8.4 


 


Magnesium   1.9 








 











  10/15/18





  13:35


 


Troponin I  0.224











Impressions: 


 





Chest CT  10/14/18 10:12


IMPRESSION:


1. CHF.  Cannot exclude superimposed pneumonia in the middle lobe.


2. Stable ascending thoracic aortic aneurysm.


 








Head CT  10/14/18 10:12


IMPRESSION:  CHRONIC CHANGES OF ATROPHY AND MICROVASCULAR ISCHEMIA.  NO ACUTE 

PROCESS.


EVIDENCE OF ACUTE STROKE: NO.


 








Abdomen/Pelvis CT  10/14/18 10:13


IMPRESSION:  No acute findings in the abdomen or pelvis.


 








Chest Ultrasound  10/15/18 09:05


IMPRESSION:  Trace left pleural fluid is present in the posterior costophrenic 

sulcus.  No thoracentesis was performed.


 








Chest X-Ray  10/17/18 06:00


IMPRESSION:


 


1. No significant change when compared to the prior studies.


2. Persistent volume loss in the left inferior hemithorax and


stable cardiomegaly.


3. Grossly stable life support lines and tubes.


 














Assessment & Plan





- Diagnosis


(1) Pleural effusion


Is this a current diagnosis for this admission?: No   





(2) Acute hypercapnic respiratory failure


Is this a current diagnosis for this admission?: Yes   


Plan: 


Improving








(3) COPD exacerbation


Is this a current diagnosis for this admission?: Yes   


Plan: 


Volume respiratory rate FiO2 airway pressures suggest successful extubation 

will proceed with extubation








(4) Pneumonia


Qualifiers: 


   Pneumonia type: due to unspecified organism   Laterality: bilateral   Lung 

location: unspecified part of lung   Qualified Code(s): J18.9 - Pneumonia, 

unspecified organism   


Is this a current diagnosis for this admission?: Yes   





- Time


Total Critical Time (Minutes): 55

## 2018-10-18 LAB
ABSOLUTE LYMPHOCYTES# (MANUAL): 0.4 10^3/UL (ref 0.5–4.7)
ABSOLUTE MONOCYTES # (MANUAL): 0.4 10^3/UL (ref 0.1–1.4)
ABSOLUTE NEUTROPHILS# (MANUAL): 6.3 10^3/UL (ref 1.7–8.2)
ADD MANUAL DIFF: YES
ALBUMIN SERPL-MCNC: 2.9 G/DL (ref 3.5–5)
ALP SERPL-CCNC: 41 U/L (ref 38–126)
ALT SERPL-CCNC: 39 U/L (ref 9–52)
ANION GAP SERPL CALC-SCNC: 10 MMOL/L (ref 5–19)
ANISOCYTOSIS BLD QL SMEAR: SLIGHT
ARTERIAL BLOOD FIO2: (no result)
ARTERIAL BLOOD H2CO3: 1.03 MMOL/L (ref 1.05–1.35)
ARTERIAL BLOOD HCO3: 24.2 MMOL/L (ref 20–24)
ARTERIAL BLOOD PCO2: 34.2 MMHG (ref 35–45)
ARTERIAL BLOOD PH: 7.47 (ref 7.35–7.45)
ARTERIAL BLOOD PO2: 67.4 MMHG (ref 80–100)
ARTERIAL BLOOD TOTAL CO2: 25.2 MMOL/L (ref 21–25)
AST SERPL-CCNC: 22 U/L (ref 14–36)
BASE EXCESS BLDA CALC-SCNC: 0.8 MMOL/L
BASOPHILS NFR BLD MANUAL: 0 % (ref 0–2)
BILIRUB DIRECT SERPL-MCNC: 0.1 MG/DL (ref 0–0.4)
BILIRUB SERPL-MCNC: 0.4 MG/DL (ref 0.2–1.3)
BUN SERPL-MCNC: 26 MG/DL (ref 7–20)
CALCIUM: 8.3 MG/DL (ref 8.4–10.2)
CHLORIDE SERPL-SCNC: 106 MMOL/L (ref 98–107)
CO2 SERPL-SCNC: 23 MMOL/L (ref 22–30)
EOSINOPHIL NFR BLD MANUAL: 0 % (ref 0–6)
ERYTHROCYTE [DISTWIDTH] IN BLOOD BY AUTOMATED COUNT: 14.1 % (ref 11.5–14)
GLUCOSE SERPL-MCNC: 130 MG/DL (ref 75–110)
HCT VFR BLD CALC: 30.1 % (ref 36–47)
HGB BLD-MCNC: 10.4 G/DL (ref 12–15.5)
MCH RBC QN AUTO: 30.6 PG (ref 27–33.4)
MCHC RBC AUTO-ENTMCNC: 34.6 G/DL (ref 32–36)
MCV RBC AUTO: 89 FL (ref 80–97)
MONOCYTES % (MANUAL): 5 % (ref 3–13)
OVALOCYTES BLD QL SMEAR: (no result)
PLATELET # BLD: 163 10^3/UL (ref 150–450)
PLATELET CLUMP BLD QL SMEAR: PRESENT
PLATELET COMMENT: ADEQUATE
PLATELET LARGE: PRESENT
POIKILOCYTOSIS BLD QL SMEAR: SLIGHT
POTASSIUM SERPL-SCNC: 3.8 MMOL/L (ref 3.6–5)
PROT SERPL-MCNC: 5 G/DL (ref 6.3–8.2)
RBC # BLD AUTO: 3.4 10^6/UL (ref 3.72–5.28)
SAO2 % BLDA: 94.6 % (ref 94–98)
SCHISTOCYTES BLD QL SMEAR: SLIGHT
SEGMENTED NEUTROPHILS % (MAN): 90 % (ref 42–78)
SODIUM SERPL-SCNC: 138.5 MMOL/L (ref 137–145)
TOTAL CELLS COUNTED BLD: 100
VARIANT LYMPHS NFR BLD MANUAL: 5 % (ref 13–45)
WBC # BLD AUTO: 7 10^3/UL (ref 4–10.5)

## 2018-10-18 RX ADMIN — METRONIDAZOLE SCH MLS/HR: 500 INJECTION, SOLUTION INTRAVENOUS at 15:56

## 2018-10-18 RX ADMIN — CLOPIDOGREL BISULFATE SCH: 75 TABLET, FILM COATED ORAL at 10:44

## 2018-10-18 RX ADMIN — SENNOSIDES, DOCUSATE SODIUM SCH EACH: 50; 8.6 TABLET, FILM COATED ORAL at 10:45

## 2018-10-18 RX ADMIN — IPRATROPIUM BROMIDE AND ALBUTEROL SULFATE SCH ML: 2.5; .5 SOLUTION RESPIRATORY (INHALATION) at 13:53

## 2018-10-18 RX ADMIN — ATORVASTATIN CALCIUM SCH MG: 10 TABLET, FILM COATED ORAL at 22:05

## 2018-10-18 RX ADMIN — Medication SCH: at 14:40

## 2018-10-18 RX ADMIN — METOPROLOL TARTRATE SCH MG: 50 TABLET, FILM COATED ORAL at 22:05

## 2018-10-18 RX ADMIN — Medication SCH: at 22:05

## 2018-10-18 RX ADMIN — MORPHINE SULFATE PRN MG: 10 INJECTION INTRAMUSCULAR; INTRAVENOUS; SUBCUTANEOUS at 13:00

## 2018-10-18 RX ADMIN — ENOXAPARIN SODIUM SCH MG: 60 INJECTION SUBCUTANEOUS at 05:27

## 2018-10-18 RX ADMIN — IPRATROPIUM BROMIDE AND ALBUTEROL SULFATE SCH ML: 2.5; .5 SOLUTION RESPIRATORY (INHALATION) at 20:00

## 2018-10-18 RX ADMIN — ENOXAPARIN SODIUM SCH MG: 60 INJECTION SUBCUTANEOUS at 17:47

## 2018-10-18 RX ADMIN — ASPIRIN SCH: 81 TABLET, CHEWABLE ORAL at 10:42

## 2018-10-18 RX ADMIN — CEFTRIAXONE SODIUM SCH MLS/HR: 1 INJECTION, POWDER, FOR SOLUTION INTRAMUSCULAR; INTRAVENOUS at 10:46

## 2018-10-18 RX ADMIN — IPRATROPIUM BROMIDE AND ALBUTEROL SULFATE SCH ML: 2.5; .5 SOLUTION RESPIRATORY (INHALATION) at 02:16

## 2018-10-18 RX ADMIN — METOPROLOL TARTRATE SCH MG: 50 TABLET, FILM COATED ORAL at 10:41

## 2018-10-18 RX ADMIN — ISOSORBIDE MONONITRATE SCH MG: 30 TABLET, EXTENDED RELEASE ORAL at 10:46

## 2018-10-18 RX ADMIN — Medication PRN MLS/HR: at 08:27

## 2018-10-18 RX ADMIN — MORPHINE SULFATE PRN MG: 10 INJECTION INTRAMUSCULAR; INTRAVENOUS; SUBCUTANEOUS at 08:28

## 2018-10-18 RX ADMIN — SODIUM CHLORIDE PRN MLS/HR: 9 INJECTION, SOLUTION INTRAVENOUS at 10:46

## 2018-10-18 RX ADMIN — IPRATROPIUM BROMIDE AND ALBUTEROL SULFATE SCH ML: 2.5; .5 SOLUTION RESPIRATORY (INHALATION) at 08:00

## 2018-10-18 RX ADMIN — METHYLPREDNISOLONE SODIUM SUCCINATE SCH MG: 40 INJECTION, POWDER, FOR SOLUTION INTRAMUSCULAR; INTRAVENOUS at 10:46

## 2018-10-18 RX ADMIN — SODIUM CHLORIDE PRN MLS/HR: 9 INJECTION, SOLUTION INTRAVENOUS at 19:20

## 2018-10-18 RX ADMIN — PANTOPRAZOLE SODIUM SCH MG: 40 INJECTION, POWDER, FOR SOLUTION INTRAVENOUS at 22:04

## 2018-10-18 RX ADMIN — Medication SCH: at 05:28

## 2018-10-18 RX ADMIN — MIDAZOLAM HYDROCHLORIDE PRN MLS/HR: 5 INJECTION, SOLUTION INTRAMUSCULAR; INTRAVENOUS at 02:34

## 2018-10-18 RX ADMIN — METRONIDAZOLE SCH MLS/HR: 500 INJECTION, SOLUTION INTRAVENOUS at 10:56

## 2018-10-18 RX ADMIN — MIDAZOLAM HYDROCHLORIDE PRN MLS/HR: 5 INJECTION, SOLUTION INTRAMUSCULAR; INTRAVENOUS at 19:19

## 2018-10-18 RX ADMIN — METRONIDAZOLE SCH MLS/HR: 500 INJECTION, SOLUTION INTRAVENOUS at 22:04

## 2018-10-18 NOTE — PROGRESS NOTE
Provider Note


Provider Note: 





CARDIOLOGY PROGRESS NOTES by Dr. Kristin Mayo on 10/18/2018.





SUBJECTIVE: The patient continues to be in atrial fibrillation, but with 

controlled ventricular response.  She is intubated and sedated.  She does not 

appear to be in any acute distress, although blood pressure slightly on the low 

side.  There is no ventricular arrhythmias seen.





PHYSICAL EXAMINATION: The patient appears to be chronically ill, and older than 

her stated age.  She is not fighting the ventilator.





 Selected Entries











  10/18/18 10/18/18





  13:54 14:19


 


Core  98.6 F





Temperature  


 


Heart Rate (  87





Monitors)  


 


Respiratory  14





Rate  


 


Blood Pressure  142/51 H


 


Blood Pressure  81





Mean  


 


O2 Sat by Pulse  95





Oximetry  


 


Fraction of 40 





Inspired Oxygen  





(FIO2)  





HEAD: Is atraumatic and normocephalic.  EYES: Pupils are equal round regular 

reactive to light.  ENT: Is negative.  SKIN: There is no skin lesions or skin 

rashes.  There is no petechia or ecchymosis.  NECK: Is supple there is no JVD, 

carotids are equal there is no bruits.  LUNGS: There is dry crackles in the 

left lower lobe.  At present there is no rales of CHF.  HEART: S1-S2 is heard 

S1 is of variable intensity.  There is no S3 gallop there is no S3 gallop the 

systolic murmur left sternal border and apex there is no rub.  ABDOMEN: Soft 

there is soft.  Bowel sounds are normal.  There is no hepatosplenomegaly.  

EXTREMITIES: Femorals are diminished.  There is no femoral bruits.  Leg pulses 

are diminished.  There is no pedal edema.  There is no DVT or cyanosis.  There 

is no clubbing.  CNS and PSYCHIATRIC not examined since the patient is 

intubated and sedated.   











  10/18/18 10/18/18





  04:50 04:50


 


WBC   7.0


 


Hgb   10.4 L


 


Hct   30.1 L


 


Plt Count   163


 


Sodium  138.5 


 


Potassium  3.8 


 


Chloride  106 


 


Carbon Dioxide  23 


 


BUN  26 H 


 


Creatinine  1.00 


 


Est GFR (Non-Af Amer)  53 L 


 


Glucose  130 H 


 


Calcium  8.3 L 


 


Magnesium  2.2 


 


Total Bilirubin  0.4 


 


Direct Bilirubin  0.1 


 


Neonat Total Bilirubin  Not Reportable 


 


Neonat Direct Bilirubin  Not Reportable 


 


Neonat Indirect Bili  Not Reportable 


 


AST  22 


 


ALT  39 


 


Alkaline Phosphatase  41 








CHEST X-ray: Shows left lower lobe consolidation/pneumonia.  There is mild 

vascular congestion.  KUB: Shows distended stomach and distended colon with gas.








1.  NON-ST elevation MI: Cannot be entirely excluded although the cause of the 

elevated troponin I is multifactorial due to electrolyte imbalance, respiratory 

failure with hypoxia and hypercapnia, and infection, and atrial fibrillation 

with fast ventricular response.  But since the patient is intubated and sedated

, and is not in a position to give a history of non-ST elevation MI cannot be 

entirely excluded.





2.  Acute respiratory failure with hypercapnia and hypoxemia.  This seems to be 

improving





3.  Left retrocardiac atelectasis versus pneumonia, would recommend treating 

the patient as pneumonia with antibiotics.





4.  Dilatation of the stomach, and dilatation of the colon: Consult being 

obtained from GI. 





5 hypomagnesemia: Has been corrected





6.  CONGESTIVE heart failure: At present seems to be compensated.  This is 

secondary to patient's cardiomyopathy with severely depressed LV ejection 

fraction.


7.  Cardiomyopathy.


8.  Atrial fibrillation, continue current beta-blocker and ACE inhibitor for a 

cardiomyopathy later would discuss with the family regarding chronic 

anticoagulation.  We will stop the Cardizem drip and switch to oral medication 

for rate control.





9.  DNR.


Her medications have been reviewed and discussed with the caregiving providers 

on the case about the patient's management plans.  Medical decision making is 

of moderate to high complexity.  40 minutes spent on the patient with more than 

50% of time spent on direct patient care.  We will follow with you.

## 2018-10-18 NOTE — PDOC PROGRESS REPORT
Subjective


Progress Note for:: 10/18/18


Subjective:: 


Going to be going to be past medical history of COPD, hypertension, A. fib (not 

on anticoagulation), PAD status post 2 femoral stent placement in the past, GI 

bleed, bowel obstruction (status post prior colectomy and reversal of ileostomy)

, CVA (right temporal ischemic stroke), REYNALDO not compliant with CPAP and CAD. 





10/16/2018.  She was admitted on 10/15/2018 for progressively worsening 

shortness of breath.  On arrival she was obtunded and was intubated for airway 

protection.  VBG in ER showed pH of 7.09 and PCO2 of 75.  CT chest on admission 

showed congestion bilateral pleural effusion and possible right middle lobe 

pneumonia. Patient was noted to be in A. fib with RVR and was started on 

Cardizem drip.





10/17/2018.  Acute events overnight.  On my encounter patient was sleeping but 

arousable.  Patient is not intubated but able to communicate by shaking her 

head.  When asked if she is in pain she is a firm and by shaking her head.  She 

is able to follow command.  When asked if she is hurting in her abdomen she 

signaling yes. 





10/18/2018.  Yesterday she was noted to have an abdominal distention.  A KUB 

was done which was nonconclusive and CT abdomen was followed,which showed 

possible bowel obstruction.  Surgery was consulted and they said this may not 

be obstruction could possibly be an ileus.  The recommendation was to continue 

monitoring the patient and if bowel distention becomes more than 10 cm they 

will do compressive colonoscopy. Today in the morning as per charge nurse 

patient had one episode of emesis around her ET tube when NG tube was clamped.  

Patient has been less active more lethargic today.  However patient still opens 

her eyes and follows commands. Several attempt was made today to contact any GI 

specialist for possible intervention unfortunately we were not able to get hold 

of anybody.  Surgery has been following and I still think that this could be 

due to ileus and constipation.  For now they will be following daily and 

recommending daily KUBs.


Reason For Visit: 


ACUTE RESPIRATORY FAILURE








Physical Exam


Vital Signs: 


 











Temp Pulse Resp BP Pulse Ox


 


 98.6 F   73   14   129/48 H  94 


 


 10/18/18 13:56  10/18/18 13:54  10/18/18 14:00  10/18/18 13:49  10/18/18 14:00








 Intake & Output











 10/17/18 10/18/18 10/19/18





 06:59 06:59 06:59


 


Intake Total 1375 1233 967


 


Output Total 610 1095 240


 


Balance 765 138 727


 


Weight 56.4 kg 57.5 kg 











General appearance: PRESENT: mild distress


Respiratory exam: PRESENT: clear to auscultation constantine, symmetrical


Cardiovascular exam: PRESENT: RRR.  ABSENT: diastolic murmur, rubs, systolic 

murmur


Pulses: PRESENT: normal dorsalis pedis pul


GI/Abdominal exam: PRESENT: distended, hypoactive bowel sounds, soft.  ABSENT: 

guarding, mass, organolmegaly, rebound, tenderness





Results


Laboratory Results: 


 





 10/18/18 04:50 





 10/18/18 04:50 





 











  10/18/18 10/18/18 10/18/18





  04:50 04:50 04:50


 


WBC    7.0


 


RBC    3.40 L


 


Hgb    10.4 L


 


Hct    30.1 L


 


MCV    89


 


MCH    30.6


 


MCHC    34.6


 


RDW    14.1 H


 


Plt Count    163


 


Seg Neutrophils %    Not Reportable


 


Lymphocytes %    Not Reportable


 


Monocytes %    Not Reportable


 


Eosinophils %    Not Reportable


 


Basophils %    Not Reportable


 


Absolute Neutrophils    Not Reportable


 


Absolute Lymphocytes    Not Reportable


 


Absolute Monocytes    Not Reportable


 


Absolute Eosinophils    Not Reportable


 


Absolute Basophils    Not Reportable


 


Carbonic Acid   1.03 L 


 


HCO3/H2CO3 Ratio   23:1 


 


ABG pH   7.47 H 


 


ABG pCO2   34.2 L 


 


ABG pO2   67.4 L 


 


ABG HCO3   24.2 H 


 


ABG O2 Saturation   94.6 


 


ABG Base Excess   0.8 


 


FiO2   40% 


 


Sodium  138.5  


 


Potassium  3.8  


 


Chloride  106  


 


Carbon Dioxide  23  


 


Anion Gap  10  


 


BUN  26 H  


 


Creatinine  1.00  


 


Est GFR ( Amer)  > 60  


 


Est GFR (Non-Af Amer)  53 L  


 


Glucose  130 H  


 


Calcium  8.3 L  


 


Magnesium  2.2  


 


Total Bilirubin  0.4  


 


AST  22  


 


ALT  39  


 


Alkaline Phosphatase  41  


 


Total Protein  5.0 L  


 


Albumin  2.9 L  








 





10/14/18 14:20   Sputum   Gram Stain - Final


10/14/18 14:20   Sputum   Sputum Culture - Final


                            Streptococcus Pneumoniae


                            Aspergillus Species


                            Normal Meenakshi





 











  10/15/18





  13:35


 


Troponin I  0.224











Impressions: 


 





Chest CT  10/14/18 10:12


IMPRESSION:


1. CHF.  Cannot exclude superimposed pneumonia in the middle lobe.


2. Stable ascending thoracic aortic aneurysm.


 








Head CT  10/14/18 10:12


IMPRESSION:  CHRONIC CHANGES OF ATROPHY AND MICROVASCULAR ISCHEMIA.  NO ACUTE 

PROCESS.


EVIDENCE OF ACUTE STROKE: NO.


 








Chest Ultrasound  10/15/18 09:05


IMPRESSION:  Trace left pleural fluid is present in the posterior costophrenic 

sulcus.  No thoracentesis was performed.


 








Abdomen/Pelvis CT  10/17/18 00:00


IMPRESSION:  Partial colonic obstruction versus pseudo-obstruction [employer]'s 

syndrome.  No high-grade obstruction.  No fecal impaction.


 








Chest X-Ray  10/18/18 06:00


IMPRESSION:  No change from yesterday


 








KUB X-Ray  10/18/18 06:00


IMPRESSION:  Abnormal but nonspecific bowel gas pattern with gas distention of 

stomach out of proportion to the remainder of the gastrointestinal tract.


 














Assessment & Plan





- Diagnosis


(1) Acute hypercapnic respiratory failure


Is this a current diagnosis for this admission?: Yes   


Plan: 


Worsening.  ABG shows mild type worsening of hypoxia.  Possibly COPD 

exacerbation combined with pneumonia and CHF exacerbation.  Patient is 

currently intubated.  BNP on admission 9440.  White blood cells within normal 

limits. Sputum culture positive for strep pneumo pansensitive.  Azithromycin 

was stopped yesterday.  Patient started on metronidazole for possible 

aspiration due to emesis she had this morning.  Switch to p.o. medication when 

patient is extubated.








(2) COPD exacerbation


Is this a current diagnosis for this admission?: Yes   


Plan: 


Currently intubated.  Solu-Medrol was decreased to 40 mg daily.








(3) Do not resuscitate


Is this a current diagnosis for this admission?: Yes   


Plan: 


Plan of care was discussed with family by prior attending Dr. Mancilla.  As per his 

note patient has refused any intervention for her AAA in the past.  Patient's 

family has opted for no chest compression if she rests.








(4) NSTEMI (non-ST elevated myocardial infarction)


Is this a current diagnosis for this admission?: Yes   


Plan: 


Elevated troponins.  As per cardiology note likely due to her metabolic reason 

versus ACS.  Continue anticoagulation.  Patient not a candidate for extensive 

cardiac workup as per cardiology note.  Continue beta-blockers, Ranexa, statins 

and ACE.








(5) Pneumonia


Qualifiers: 


   Pneumonia type: due to unspecified organism   Laterality: bilateral   Lung 

location: unspecified part of lung   Qualified Code(s): J18.9 - Pneumonia, 

unspecified organism   


Is this a current diagnosis for this admission?: Yes   


Plan: 


Most likely community-acquired.  Sputum culture positive for strep pneumo 

pansensitive.  Continue Rocephin DC azithromycin.  Switch to oral antibiotics 

once patient is extubated and able to take p.o. medication. 








(6) CHF (congestive heart failure)


Qualifiers: 


   Heart failure type: combined systolic and diastolic   Heart failure 

chronicity: acute   Qualified Code(s): I50.41 - Acute combined systolic (

congestive) and diastolic (congestive) heart failure   


Is this a current diagnosis for this admission?: Yes   


Plan: 


Systolic dysfunction.  2D echo shows ejection fraction of 30-35%.  Continue 

Lasix guided by her vitals.  Monitor volume status.





Pleural effusion was noted on initial CT.  Chest ultrasound ordered by 

pulmonary only showed minimal pleural fluid.








(7) Afib


Is this a current diagnosis for this admission?: No   


Plan: 


Rate controlled.  Patient was restarted on Cardizem drip this morning.  As per 

daughter patient is not on chronic anticoagulation due to GI bleeding caused by 

anticoagulation treatment in the past.








(8) Constipation


Is this a current diagnosis for this admission?: Yes   


Plan: 


Patient has history of chronic constipation.  A CT abdomen was done which was 

indicative of partial obstruction or pseudoobstruction surgery was consulted 

for recommendation and they stated that at this is not an obstruction possibly 

be ileus or constipation.  Continue monitoring her if does not improve then may 

consider compressive colonoscopy .








(9) Abdominal distention


Is this a current diagnosis for this admission?: Yes   


Plan: 


Likely due to ileus and constipation.  Patient has history of obstruction (

status post prior colectomy and reversal of ileostomy) continue NG tube with 

intermittent suction.  Surgery following.  Daily KUB.  Per surgery if bowel 

distention gets more 10 cm they will consider a compressive colonoscopy.  

Several attempts were made to get a GI consult for possible intervention 

unfortunately they were unsuccessful.

## 2018-10-18 NOTE — RADIOLOGY REPORT (SQ)
EXAM DESCRIPTION:  CHEST SINGLE VIEW



COMPLETED DATE/TIME:  10/18/2018 7:30 am



REASON FOR STUDY:  resp failure



COMPARISON:  CT chest 10/14/2018

AP chest 10/17/2018, 10/16/2018, 10/15/2018, 10/14/2018



EXAM PARAMETERS:  NUMBER OF VIEWS: One view.

TECHNIQUE: Single frontal radiographic view of the chest acquired.

RADIATION DOSE: NA

LIMITATIONS: None.



FINDINGS:  LUNGS AND PLEURA: Trace bilateral pleural effusions are similar compared to previous studi
es.

Pulmonary vascular congestion and Kerley lines are present.

There is left lower lobe consolidation atelectasis versus pneumonia, unchanged

MEDIASTINUM AND HILAR STRUCTURES: No masses.  Contour normal.

HEART AND VASCULAR STRUCTURES: Stable moderate cardiomegaly

BONES: No acute findings.

HARDWARE: Endotracheal tube tip 3 to 4 cm above the arash.  Nasogastric tube tip and side port in th
e stomach.  Left jugular central line tip in the superior vena cava

OTHER: No other significant finding.



IMPRESSION:  No change from yesterday



TECHNICAL DOCUMENTATION:  JOB ID:  [id number]

 2011 [employer]- All Rights Reserved



Reading location - IP/workstation name: [email or web address]

## 2018-10-18 NOTE — PDOC PROGRESS REPORT
Subjective


Progress Note for:: 10/18/18


Subjective:: 





Intubated and sedated.


Reason For Visit: 


ACUTE RESPIRATORY FAILURE








Physical Exam


Vital Signs: 


 











Temp Pulse Resp BP Pulse Ox


 


 98.6 F   108 H  14   116/44 L  94 


 


 10/18/18 11:43  10/18/18 08:05  10/18/18 11:00  10/18/18 10:49  10/18/18 12:25








 Intake & Output











 10/17/18 10/18/18 10/19/18





 06:59 06:59 06:59


 


Intake Total 1375 1233 967


 


Output Total 610 1095 195


 


Balance 765 138 772


 


Weight 56.4 kg 57.5 kg 











General appearance: PRESENT: no acute distress


Respiratory exam: PRESENT: rhonchi


Cardiovascular exam: PRESENT: RRR


GI/Abdominal exam: PRESENT: other - Distended but soft.  No apparent peritoneal 

signs.





Results


Laboratory Results: 


 





 10/18/18 04:50 





 10/18/18 04:50 





 











  10/18/18 10/18/18 10/18/18





  04:50 04:50 04:50


 


WBC    7.0


 


RBC    3.40 L


 


Hgb    10.4 L


 


Hct    30.1 L


 


MCV    89


 


MCH    30.6


 


MCHC    34.6


 


RDW    14.1 H


 


Plt Count    163


 


Seg Neutrophils %    Not Reportable


 


Lymphocytes %    Not Reportable


 


Monocytes %    Not Reportable


 


Eosinophils %    Not Reportable


 


Basophils %    Not Reportable


 


Absolute Neutrophils    Not Reportable


 


Absolute Lymphocytes    Not Reportable


 


Absolute Monocytes    Not Reportable


 


Absolute Eosinophils    Not Reportable


 


Absolute Basophils    Not Reportable


 


Carbonic Acid   1.03 L 


 


HCO3/H2CO3 Ratio   23:1 


 


ABG pH   7.47 H 


 


ABG pCO2   34.2 L 


 


ABG pO2   67.4 L 


 


ABG HCO3   24.2 H 


 


ABG O2 Saturation   94.6 


 


ABG Base Excess   0.8 


 


FiO2   40% 


 


Sodium  138.5  


 


Potassium  3.8  


 


Chloride  106  


 


Carbon Dioxide  23  


 


Anion Gap  10  


 


BUN  26 H  


 


Creatinine  1.00  


 


Est GFR ( Amer)  > 60  


 


Est GFR (Non-Af Amer)  53 L  


 


Glucose  130 H  


 


Calcium  8.3 L  


 


Magnesium  2.2  


 


Total Bilirubin  0.4  


 


AST  22  


 


ALT  39  


 


Alkaline Phosphatase  41  


 


Total Protein  5.0 L  


 


Albumin  2.9 L  








 





10/14/18 14:20   Sputum   Gram Stain - Final


10/14/18 14:20   Sputum   Sputum Culture - Final


                            Streptococcus Pneumoniae


                            Aspergillus Species


                            Normal Meenakshi





 











  10/15/18





  13:35


 


Troponin I  0.224











Impressions: 


 





Chest CT  10/14/18 10:12


IMPRESSION:


1. CHF.  Cannot exclude superimposed pneumonia in the middle lobe.


2. Stable ascending thoracic aortic aneurysm.


 








Head CT  10/14/18 10:12


IMPRESSION:  CHRONIC CHANGES OF ATROPHY AND MICROVASCULAR ISCHEMIA.  NO ACUTE 

PROCESS.


EVIDENCE OF ACUTE STROKE: NO.


 








Chest Ultrasound  10/15/18 09:05


IMPRESSION:  Trace left pleural fluid is present in the posterior costophrenic 

sulcus.  No thoracentesis was performed.


 








Abdomen/Pelvis CT  10/17/18 00:00


IMPRESSION:  Partial colonic obstruction versus pseudo-obstruction [place]'s 

syndrome.  No high-grade obstruction.  No fecal impaction.


 








Chest X-Ray  10/18/18 06:00


IMPRESSION:  No change from yesterday


 








KUB X-Ray  10/18/18 06:00


IMPRESSION:  Abnormal but nonspecific bowel gas pattern with gas distention of 

stomach out of proportion to the remainder of the gastrointestinal tract.


 














Assessment & Plan





- Diagnosis


(1) Abdominal distention


Is this a current diagnosis for this admission?: Yes   


Plan: 


Likely due to ileus and constipation.  Patient had emesis this morning while 

the NG tube was clamped.  We will keep the NG tube to suction.  Other than 

gastric distention while the NG tube was clamped, nonspecific bowel gas 

pattern.  I do not think she has a large bowel obstruction.  Continue daily x-

rays.  General surgery will continue to follow.

## 2018-10-18 NOTE — RADIOLOGY REPORT (SQ)
EXAM DESCRIPTION:  KUB/ABDOMEN (SINGLE VIEW)



COMPLETED DATE/TIME:  10/18/2018 7:30 am



REASON FOR STUDY:  abdominal distention



COMPARISON:  CT abdomen pelvis 10/14/2018, 10/17/2018

KUB 10/17/2018, 7/16/2014



NUMBER OF VIEWS:  One view.



TECHNIQUE:   Supine radiographic image of the abdomen acquired.



LIMITATIONS:  None.



FINDINGS:  BOWEL GAS PATTERN: Gaseous distension of stomach out of proportion to remainder of the GI 
tract.  Mild gaseous distension of small bowel and colon.  Nonspecific bowel gas pattern.

CALCIFICATIONS: No suspicious calcifications.

SOFT TISSUES: No gross mass or suggestion of organomegaly.

HARDWARE: Nasogastric tube tip and side port in the stomach fundus.  X shaped pessary in the vagina. 
 Schwartz catheter in the bladder.  Right external iliac arterial vascular stent

BONES: Osteoporotic.  No gross acute fracture

OTHER: No other significant finding.



IMPRESSION:  Abnormal but nonspecific bowel gas pattern with gas distention of stomach out of proport
ion to the remainder of the gastrointestinal tract.



TECHNICAL DOCUMENTATION:  JOB ID:  [id number]

 2011 [employer]- All Rights Reserved



Reading location - IP/workstation name: [email or web address]

## 2018-10-19 LAB
ADD MANUAL DIFF: NO
ANION GAP SERPL CALC-SCNC: 7 MMOL/L (ref 5–19)
ARTERIAL BLOOD FIO2: (no result)
ARTERIAL BLOOD H2CO3: 0.94 MMOL/L (ref 1.05–1.35)
ARTERIAL BLOOD HCO3: 21.8 MMOL/L (ref 20–24)
ARTERIAL BLOOD PCO2: 31.3 MMHG (ref 35–45)
ARTERIAL BLOOD PH: 7.46 (ref 7.35–7.45)
ARTERIAL BLOOD PO2: 87.8 MMHG (ref 80–100)
ARTERIAL BLOOD TOTAL CO2: 22.7 MMOL/L (ref 21–25)
BASE EXCESS BLDA CALC-SCNC: -1.4 MMOL/L
BASOPHILS # BLD AUTO: 0 10^3/UL (ref 0–0.2)
BASOPHILS NFR BLD AUTO: 0.1 % (ref 0–2)
BUN SERPL-MCNC: 25 MG/DL (ref 7–20)
CALCIUM: 8.2 MG/DL (ref 8.4–10.2)
CHLORIDE SERPL-SCNC: 109 MMOL/L (ref 98–107)
CO2 SERPL-SCNC: 23 MMOL/L (ref 22–30)
EOSINOPHIL # BLD AUTO: 0 10^3/UL (ref 0–0.6)
EOSINOPHIL NFR BLD AUTO: 0 % (ref 0–6)
ERYTHROCYTE [DISTWIDTH] IN BLOOD BY AUTOMATED COUNT: 14 % (ref 11.5–14)
GLUCOSE SERPL-MCNC: 87 MG/DL (ref 75–110)
HCT VFR BLD CALC: 30 % (ref 36–47)
HGB BLD-MCNC: 10.4 G/DL (ref 12–15.5)
LYMPHOCYTES # BLD AUTO: 0.6 10^3/UL (ref 0.5–4.7)
LYMPHOCYTES NFR BLD AUTO: 8.3 % (ref 13–45)
MCH RBC QN AUTO: 30.8 PG (ref 27–33.4)
MCHC RBC AUTO-ENTMCNC: 34.6 G/DL (ref 32–36)
MCV RBC AUTO: 89 FL (ref 80–97)
MONOCYTES # BLD AUTO: 0.7 10^3/UL (ref 0.1–1.4)
MONOCYTES NFR BLD AUTO: 9.6 % (ref 3–13)
NEUTROPHILS # BLD AUTO: 5.8 10^3/UL (ref 1.7–8.2)
NEUTS SEG NFR BLD AUTO: 82 % (ref 42–78)
PLATELET # BLD: 166 10^3/UL (ref 150–450)
POTASSIUM SERPL-SCNC: 3.6 MMOL/L (ref 3.6–5)
RBC # BLD AUTO: 3.37 10^6/UL (ref 3.72–5.28)
SAO2 % BLDA: 97.2 % (ref 94–98)
SODIUM SERPL-SCNC: 139.1 MMOL/L (ref 137–145)
TOTAL CELLS COUNTED % (AUTO): 100 %
WBC # BLD AUTO: 7 10^3/UL (ref 4–10.5)

## 2018-10-19 RX ADMIN — METRONIDAZOLE SCH MLS/HR: 500 INJECTION, SOLUTION INTRAVENOUS at 05:08

## 2018-10-19 RX ADMIN — IPRATROPIUM BROMIDE AND ALBUTEROL SULFATE SCH ML: 2.5; .5 SOLUTION RESPIRATORY (INHALATION) at 07:53

## 2018-10-19 RX ADMIN — IPRATROPIUM BROMIDE AND ALBUTEROL SULFATE SCH ML: 2.5; .5 SOLUTION RESPIRATORY (INHALATION) at 20:28

## 2018-10-19 RX ADMIN — MIDAZOLAM HYDROCHLORIDE PRN MLS/HR: 5 INJECTION, SOLUTION INTRAMUSCULAR; INTRAVENOUS at 20:56

## 2018-10-19 RX ADMIN — MIDAZOLAM HYDROCHLORIDE PRN MLS/HR: 5 INJECTION, SOLUTION INTRAMUSCULAR; INTRAVENOUS at 05:09

## 2018-10-19 RX ADMIN — CLOPIDOGREL BISULFATE SCH MG: 75 TABLET, FILM COATED ORAL at 09:34

## 2018-10-19 RX ADMIN — ISOSORBIDE MONONITRATE SCH MG: 30 TABLET, EXTENDED RELEASE ORAL at 09:33

## 2018-10-19 RX ADMIN — ASPIRIN SCH MG: 81 TABLET, CHEWABLE ORAL at 09:35

## 2018-10-19 RX ADMIN — SODIUM CHLORIDE PRN MLS/HR: 9 INJECTION, SOLUTION INTRAVENOUS at 23:49

## 2018-10-19 RX ADMIN — METRONIDAZOLE SCH MLS/HR: 500 INJECTION, SOLUTION INTRAVENOUS at 16:31

## 2018-10-19 RX ADMIN — GLYCERIN SCH: 2.1 SUPPOSITORY RECTAL at 12:58

## 2018-10-19 RX ADMIN — PANTOPRAZOLE SODIUM SCH MG: 40 INJECTION, POWDER, FOR SOLUTION INTRAVENOUS at 23:01

## 2018-10-19 RX ADMIN — Medication SCH: at 23:02

## 2018-10-19 RX ADMIN — ENOXAPARIN SODIUM SCH MG: 60 INJECTION SUBCUTANEOUS at 05:09

## 2018-10-19 RX ADMIN — CEFTRIAXONE SODIUM SCH MLS/HR: 1 INJECTION, POWDER, FOR SOLUTION INTRAMUSCULAR; INTRAVENOUS at 09:34

## 2018-10-19 RX ADMIN — METRONIDAZOLE SCH MLS/HR: 500 INJECTION, SOLUTION INTRAVENOUS at 20:41

## 2018-10-19 RX ADMIN — ENOXAPARIN SODIUM SCH MG: 60 INJECTION SUBCUTANEOUS at 17:59

## 2018-10-19 RX ADMIN — SENNOSIDES, DOCUSATE SODIUM SCH EACH: 50; 8.6 TABLET, FILM COATED ORAL at 09:32

## 2018-10-19 RX ADMIN — BISACODYL SCH MG: 10 SUPPOSITORY RECTAL at 12:47

## 2018-10-19 RX ADMIN — METOPROLOL TARTRATE SCH MG: 50 TABLET, FILM COATED ORAL at 09:34

## 2018-10-19 RX ADMIN — Medication SCH: at 22:56

## 2018-10-19 RX ADMIN — Medication SCH: at 08:44

## 2018-10-19 RX ADMIN — METOPROLOL TARTRATE SCH MG: 50 TABLET, FILM COATED ORAL at 23:00

## 2018-10-19 RX ADMIN — Medication SCH: at 16:28

## 2018-10-19 RX ADMIN — METRONIDAZOLE SCH MLS/HR: 500 INJECTION, SOLUTION INTRAVENOUS at 09:32

## 2018-10-19 RX ADMIN — ATORVASTATIN CALCIUM SCH MG: 10 TABLET, FILM COATED ORAL at 23:01

## 2018-10-19 RX ADMIN — MORPHINE SULFATE PRN MG: 10 INJECTION INTRAMUSCULAR; INTRAVENOUS; SUBCUTANEOUS at 08:44

## 2018-10-19 RX ADMIN — MORPHINE SULFATE PRN MG: 10 INJECTION INTRAMUSCULAR; INTRAVENOUS; SUBCUTANEOUS at 20:33

## 2018-10-19 RX ADMIN — METHYLPREDNISOLONE SODIUM SUCCINATE SCH MG: 40 INJECTION, POWDER, FOR SOLUTION INTRAMUSCULAR; INTRAVENOUS at 09:34

## 2018-10-19 RX ADMIN — BISACODYL SCH MG: 10 SUPPOSITORY RECTAL at 23:55

## 2018-10-19 RX ADMIN — IPRATROPIUM BROMIDE AND ALBUTEROL SULFATE SCH ML: 2.5; .5 SOLUTION RESPIRATORY (INHALATION) at 01:42

## 2018-10-19 RX ADMIN — GLYCERIN SCH EACH: 2.1 SUPPOSITORY RECTAL at 17:59

## 2018-10-19 RX ADMIN — BISACODYL SCH: 10 SUPPOSITORY RECTAL at 18:00

## 2018-10-19 RX ADMIN — IPRATROPIUM BROMIDE AND ALBUTEROL SULFATE SCH ML: 2.5; .5 SOLUTION RESPIRATORY (INHALATION) at 14:27

## 2018-10-19 RX ADMIN — MORPHINE SULFATE PRN MG: 10 INJECTION INTRAMUSCULAR; INTRAVENOUS; SUBCUTANEOUS at 15:58

## 2018-10-19 NOTE — RADIOLOGY REPORT (SQ)
CLINICAL HISTORY:  resp failure/aspiration 



COMPARISON: October 18, 2018.



TECHNIQUE: XR CHEST 1 VIEW 10/19/2018 6:00 AM CDT



FINDINGS: 



Cardiac silhouette is enlarged. There is bibasilar airspace

disease. There are bilateral pleural effusions. There is no

pneumothorax. There are no acute osseous findings. Endotracheal

tube, nasogastric tube and left central line are unchanged.



IMPRESSION: 



No change.

## 2018-10-19 NOTE — PROGRESS NOTE
Provider Note


Provider Note: 





Cardiology PROGRESS NOTE: By Dr. Kristin Mayo on 10/19/2018.





Note that the patient due to nonimprovement clinical condition, the family has 

decided to make her comfort measures only.  Hence will sign off.  Thanking you

## 2018-10-19 NOTE — PDOC PROGRESS REPORT
Subjective


Progress Note for:: 10/19/18


Subjective:: 





intubated, sedated, appears comfortable


Reason For Visit: 


ACUTE RESPIRATORY FAILURE








Physical Exam


Vital Signs: 


 











Temp Pulse Resp BP Pulse Ox


 


 98.8 F   96   14   132/57 H  98 


 


 10/19/18 08:00  10/19/18 08:00  10/19/18 08:00  10/19/18 08:00  10/19/18 08:00








 Intake & Output











 10/18/18 10/19/18 10/20/18





 06:59 06:59 06:59


 


Intake Total 1233 1772 


 


Output Total 1095 1055 60


 


Balance 138 717 -60


 


Weight 57.5 kg 58.8 kg 











General appearance: PRESENT: no acute distress


GI/Abdominal exam: PRESENT: distended, hypoactive bowel sounds, soft, other - 

no peritoineal signs; tympanitic on percussion





Results


Laboratory Results: 


 





 10/19/18 05:20 





 10/19/18 05:20 





 











  10/19/18 10/19/18 10/19/18





  05:20 05:20 05:20


 


WBC    7.0


 


RBC    3.37 L


 


Hgb    10.4 L


 


Hct    30.0 L


 


MCV    89


 


MCH    30.8


 


MCHC    34.6


 


RDW    14.0


 


Plt Count    166


 


Seg Neutrophils %    82.0 H


 


Lymphocytes %    8.3 L


 


Monocytes %    9.6


 


Eosinophils %    0.0


 


Basophils %    0.1


 


Absolute Neutrophils    5.8


 


Absolute Lymphocytes    0.6


 


Absolute Monocytes    0.7


 


Absolute Eosinophils    0.0


 


Absolute Basophils    0.0


 


Carbonic Acid  0.94 L  


 


HCO3/H2CO3 Ratio  23:1  


 


ABG pH  7.46 H  


 


ABG pCO2  31.3 L  


 


ABG pO2  87.8  


 


ABG HCO3  21.8  


 


ABG O2 Saturation  97.2  


 


ABG Base Excess  -1.4  


 


FiO2  40%  


 


Sodium   139.1 


 


Potassium   3.6 


 


Chloride   109 H 


 


Carbon Dioxide   23 


 


Anion Gap   7 


 


BUN   25 H 


 


Creatinine   0.90 


 


Est GFR ( Amer)   > 60 


 


Est GFR (Non-Af Amer)   > 60 


 


Glucose   87 


 


Calcium   8.2 L 


 


Magnesium   2.2 








 











  10/15/18





  13:35


 


Troponin I  0.224











Impressions: 


 





Chest CT  10/14/18 10:12


IMPRESSION:


1. CHF.  Cannot exclude superimposed pneumonia in the middle lobe.


2. Stable ascending thoracic aortic aneurysm.


 








Head CT  10/14/18 10:12


IMPRESSION:  CHRONIC CHANGES OF ATROPHY AND MICROVASCULAR ISCHEMIA.  NO ACUTE 

PROCESS.


EVIDENCE OF ACUTE STROKE: NO.


 








Chest Ultrasound  10/15/18 09:05


IMPRESSION:  Trace left pleural fluid is present in the posterior costophrenic 

sulcus.  No thoracentesis was performed.


 








Abdomen/Pelvis CT  10/17/18 00:00


IMPRESSION:  Partial colonic obstruction versus pseudo-obstruction Thu's 

syndrome.  No high-grade obstruction.  No fecal impaction.


 








KUB X-Ray  10/18/18 06:00


IMPRESSION:  Abnormal but nonspecific bowel gas pattern with gas distention of 

stomach out of proportion to the remainder of the gastrointestinal tract.


 








Abdomen X-Ray  10/19/18 06:00


IMPRESSION:


 


1. No significant change when compared to the prior study.


2. Nonspecific and nonobstructive bowel gas pattern.


3. Other chronic findings as described above.


 








Chest X-Ray  10/19/18 06:00


IMPRESSION: 


 


No change.


 














Assessment & Plan





- Diagnosis


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


Is this a current diagnosis for this admission?: Yes   





- Plan Summary


Plan Summary: 





A/





Thu's syndrome


Bibasilar pneumonia


Blood work WNL


Physical exam shows sliught improvement with hypoactive bowel sounds








P/





start Dulcolax and glycerine suppositories q6 hrs alternated

## 2018-10-19 NOTE — RADIOLOGY REPORT (SQ)
EXAM DESCRIPTION: 



X-ray abdomen 2 view



CLINICAL DATA:



85-year-old female with abdominal distention, follow-up.



TECHNICAL DATA: 



Supine and upright radiographs of the abdomen were performed.



Comparison:  Prior study performed on 10/18/2018..



FINDINGS:



The bowel gas pattern is nonspecific and nonobstructive. An

esophagogastric tube projects over the stomach.   



Arterial vascular calcifications are noted as well as

costochondral cartilage calcifications. There is a right external

iliac arterial vascular stent. There is an x--shaped radiopaque

metallic foreign body projecting over the lower pelvis just to

the right of midline as noted previously. There is partial

visualization of a Schwartz catheter.



No abnormal air collections are identified.



No focal soft tissue abnormalities are seen.



No acute osseous abnormalities are identified.



IMPRESSION:



1. No significant change when compared to the prior study.

2. Nonspecific and nonobstructive bowel gas pattern.

3. Other chronic findings as described above.

## 2018-10-20 VITALS — SYSTOLIC BLOOD PRESSURE: 129 MMHG | DIASTOLIC BLOOD PRESSURE: 48 MMHG

## 2018-10-20 LAB
ADD MANUAL DIFF: NO
ALBUMIN SERPL-MCNC: 2.6 G/DL (ref 3.5–5)
ALP SERPL-CCNC: 37 U/L (ref 38–126)
ALT SERPL-CCNC: 44 U/L (ref 9–52)
ANION GAP SERPL CALC-SCNC: 9 MMOL/L (ref 5–19)
ARTERIAL BLOOD FIO2: (no result)
ARTERIAL BLOOD H2CO3: 0.91 MMOL/L (ref 1.05–1.35)
ARTERIAL BLOOD HCO3: 20 MMOL/L (ref 20–24)
ARTERIAL BLOOD PCO2: 30.3 MMHG (ref 35–45)
ARTERIAL BLOOD PH: 7.44 (ref 7.35–7.45)
ARTERIAL BLOOD PO2: 79.5 MMHG (ref 80–100)
ARTERIAL BLOOD TOTAL CO2: 21 MMOL/L (ref 21–25)
AST SERPL-CCNC: 21 U/L (ref 14–36)
BASE EXCESS BLDA CALC-SCNC: -3.3 MMOL/L
BASOPHILS # BLD AUTO: 0 10^3/UL (ref 0–0.2)
BASOPHILS NFR BLD AUTO: 0 % (ref 0–2)
BILIRUB DIRECT SERPL-MCNC: 0.2 MG/DL (ref 0–0.4)
BILIRUB SERPL-MCNC: 0.4 MG/DL (ref 0.2–1.3)
BUN SERPL-MCNC: 26 MG/DL (ref 7–20)
CALCIUM: 8.2 MG/DL (ref 8.4–10.2)
CHLORIDE SERPL-SCNC: 109 MMOL/L (ref 98–107)
CO2 SERPL-SCNC: 21 MMOL/L (ref 22–30)
EOSINOPHIL # BLD AUTO: 0 10^3/UL (ref 0–0.6)
EOSINOPHIL NFR BLD AUTO: 0.1 % (ref 0–6)
ERYTHROCYTE [DISTWIDTH] IN BLOOD BY AUTOMATED COUNT: 14.3 % (ref 11.5–14)
GLUCOSE SERPL-MCNC: 75 MG/DL (ref 75–110)
HCT VFR BLD CALC: 30.4 % (ref 36–47)
HGB BLD-MCNC: 10.7 G/DL (ref 12–15.5)
LYMPHOCYTES # BLD AUTO: 1 10^3/UL (ref 0.5–4.7)
LYMPHOCYTES NFR BLD AUTO: 12.4 % (ref 13–45)
MCH RBC QN AUTO: 30.9 PG (ref 27–33.4)
MCHC RBC AUTO-ENTMCNC: 35 G/DL (ref 32–36)
MCV RBC AUTO: 88 FL (ref 80–97)
MONOCYTES # BLD AUTO: 0.7 10^3/UL (ref 0.1–1.4)
MONOCYTES NFR BLD AUTO: 9.6 % (ref 3–13)
NEUTROPHILS # BLD AUTO: 6 10^3/UL (ref 1.7–8.2)
NEUTS SEG NFR BLD AUTO: 77.9 % (ref 42–78)
PLATELET # BLD: 162 10^3/UL (ref 150–450)
POTASSIUM SERPL-SCNC: 3.4 MMOL/L (ref 3.6–5)
PROT SERPL-MCNC: 4.6 G/DL (ref 6.3–8.2)
RBC # BLD AUTO: 3.45 10^6/UL (ref 3.72–5.28)
SAO2 % BLDA: 96.3 % (ref 94–98)
SODIUM SERPL-SCNC: 139.1 MMOL/L (ref 137–145)
TOTAL CELLS COUNTED % (AUTO): 100 %
WBC # BLD AUTO: 7.7 10^3/UL (ref 4–10.5)

## 2018-10-20 RX ADMIN — GLYCERIN SCH EACH: 2.1 SUPPOSITORY RECTAL at 05:57

## 2018-10-20 RX ADMIN — IPRATROPIUM BROMIDE AND ALBUTEROL SULFATE SCH ML: 2.5; .5 SOLUTION RESPIRATORY (INHALATION) at 02:04

## 2018-10-20 RX ADMIN — MORPHINE SULFATE PRN MG: 10 INJECTION INTRAMUSCULAR; INTRAVENOUS; SUBCUTANEOUS at 12:04

## 2018-10-20 RX ADMIN — METRONIDAZOLE SCH: 500 INJECTION, SOLUTION INTRAVENOUS at 14:38

## 2018-10-20 RX ADMIN — SENNOSIDES, DOCUSATE SODIUM SCH: 50; 8.6 TABLET, FILM COATED ORAL at 11:34

## 2018-10-20 RX ADMIN — Medication SCH: at 14:39

## 2018-10-20 RX ADMIN — IPRATROPIUM BROMIDE AND ALBUTEROL SULFATE SCH ML: 2.5; .5 SOLUTION RESPIRATORY (INHALATION) at 08:12

## 2018-10-20 RX ADMIN — CEFTRIAXONE SODIUM SCH: 1 INJECTION, POWDER, FOR SOLUTION INTRAMUSCULAR; INTRAVENOUS at 11:34

## 2018-10-20 RX ADMIN — BISACODYL SCH: 10 SUPPOSITORY RECTAL at 14:37

## 2018-10-20 RX ADMIN — ISOSORBIDE MONONITRATE SCH: 30 TABLET, EXTENDED RELEASE ORAL at 11:05

## 2018-10-20 RX ADMIN — METRONIDAZOLE SCH MLS/HR: 500 INJECTION, SOLUTION INTRAVENOUS at 05:58

## 2018-10-20 RX ADMIN — GLYCERIN SCH: 2.1 SUPPOSITORY RECTAL at 00:05

## 2018-10-20 RX ADMIN — BISACODYL SCH: 10 SUPPOSITORY RECTAL at 05:59

## 2018-10-20 RX ADMIN — Medication SCH: at 05:59

## 2018-10-20 RX ADMIN — METHYLPREDNISOLONE SODIUM SUCCINATE SCH: 40 INJECTION, POWDER, FOR SOLUTION INTRAMUSCULAR; INTRAVENOUS at 11:01

## 2018-10-20 RX ADMIN — ASPIRIN SCH: 81 TABLET, CHEWABLE ORAL at 11:33

## 2018-10-20 RX ADMIN — GLYCERIN SCH: 2.1 SUPPOSITORY RECTAL at 14:37

## 2018-10-20 RX ADMIN — METRONIDAZOLE SCH MLS/HR: 500 INJECTION, SOLUTION INTRAVENOUS at 08:08

## 2018-10-20 RX ADMIN — CLOPIDOGREL BISULFATE SCH: 75 TABLET, FILM COATED ORAL at 11:33

## 2018-10-20 RX ADMIN — ENOXAPARIN SODIUM SCH MG: 60 INJECTION SUBCUTANEOUS at 05:58

## 2018-10-20 RX ADMIN — IPRATROPIUM BROMIDE AND ALBUTEROL SULFATE SCH: 2.5; .5 SOLUTION RESPIRATORY (INHALATION) at 14:12

## 2018-10-20 RX ADMIN — METOPROLOL TARTRATE SCH MG: 50 TABLET, FILM COATED ORAL at 11:29

## 2018-10-20 NOTE — RADIOLOGY REPORT (SQ)
EXAM DESCRIPTION: 



X-ray abdomen 1 view



CLINICAL DATA:



85-year-old female who presents for follow-up of abdominal

distention.



TECHNICAL DATA: 



Two x-ray views of the abdomen were performed including supine

and upright images.



Comparison:  Prior study performed on 10/19/2018..



FINDINGS:



The bowel gas pattern is nonspecific and nonobstructive.   The

tip of the feeding tube projects over the region of the stomach

and is grossly stable.



No pathologic abdominal or pelvic calcifications are identified.

There are arterial vascular calcifications. There is a metallic

vascular stent projecting over the region of the right external

iliac artery. 



No abnormal air collections are identified. No definite air-fluid

levels are identified.



No focal soft tissue abnormalities are seen.



No acute osseous abnormalities are identified.



IMPRESSION:



1. No significant change when compared to the prior study.

2. Nonspecific and nonobstructive bowel gas pattern.

3. Nasogastric tube tip projects over the region of the stomach.

4. Vascular calcifications.

## 2018-10-20 NOTE — PDOC DISCHARGE SUMMARY
General





- Admit/Disc Date/PCP


Admission Date/Primary Care Provider: 


  10/14/18 12:17





  MONIKA DAMON MD





Discharge Date: 10/20/18





- Discharge Diagnosis


(1) Acute hypercapnic respiratory failure


Is this a current diagnosis for this admission?: Yes   





(2) COPD exacerbation


Is this a current diagnosis for this admission?: Yes   





(3) Do not resuscitate


Is this a current diagnosis for this admission?: Yes   





(4) NSTEMI (non-ST elevated myocardial infarction)


Is this a current diagnosis for this admission?: Yes   





(5) Pneumonia


Is this a current diagnosis for this admission?: Yes   





(6) CHF (congestive heart failure)


Is this a current diagnosis for this admission?: Yes   





(7) Afib


Is this a current diagnosis for this admission?: No   





(8) Constipation


Is this a current diagnosis for this admission?: Yes   





(9) Abdominal distention


Is this a current diagnosis for this admission?: Yes   





- Additional Information


Resuscitation Status: Do Not Resuscitate


Discharge Diet: As Tolerated


Discharge Activity: Activity As Tolerated


Home Medications: 








Albuterol Sulfate [Ventolin HFA MDI 18 GM] 2 puff IH Q4HP PRN 10/14/18 


Budesonide/Formoterol Fumarate [Symbicort -4.5 mcg Inhaler 6 gm] 2 puff 

IH Q12 10/14/18 


Hydrocodone/Acetaminophen [Norco  mg Tablet] 1 tab PO Q6HP PRN 10/14/18 


Isosorbide Mononitrate [Imdur 30 mg Tablet.er] 30 mg PO DAILY 10/14/18 


Metoprolol Succinate [Toprol Xl 50 mg Tab.sr] 50 mg PO DAILY 10/14/18 


Nitroglycerin [Nitrostat 0.4 mg (1/150 Gr) Tabs 25/Bottle] 0.4 mg SL Q5MP PRN 10

/14/18 


Pantoprazole Sodium [Protonix] 40 mg PO DAILY 10/14/18 


Pravastatin Sodium [Pravachol] 40 mg PO QHS 10/14/18 


Prednisone 2.5 mg PO DAILY 10/15/18 











History of Present Illness


Patient complains of: Shortness of breath


History of Present Illness: 


REUBEN SALINAS is a 85 year old female past medical history of COPD, 

hypertension, A. fib (not on anticoagulation), PAD status post 2 femoral stent 

placement in the past, GI bleed, bowel obstruction (status post prior colectomy 

and reversal of ileostomy), CVA (right temporal ischemic stroke), REYNALDO not 

compliant with CPAP and CAD who was brought in because of progressive shortness 

of breath.





Hospital Course


Hospital Course: 


(1) Acute hypercapnic respiratory failure


Unchanged.  ABG improving.  Chest x-ray unchanged.  Intubated. 


Possibly COPD exacerbation combined with pneumonia and CHF exacerbation. BNP on 

admission 9440.  White blood cells within normal limits. Sputum culture 

positive for strep pneumo pansensitive.  Continued Rocephin IV.  Metronidazole 

IV was added for possible aspiration due to emesis 10/18/2018. 





(2) COPD exacerbation


Intubated.   Solu-Medrol was decreased to 40 mg daily.





(3) Do not resuscitate


Patient is DNR as per family's request.  Family is discussing possible 

transition of care to [facility] and home hospice.  As per previous attendings note 

patient has refused any intervention for her AAA in the past.  Patient's family 

has opted for no chest compression if she codes.  Family decided to transition 

patient to comfort measures and discharge with home hospice.





(4) NSTEMI (non-ST elevated myocardial infarction)


Elevated troponins.  As per cardiology note likely due to her metabolic reason 

versus ACS.  Continued on anticoagulation.  Patient not a candidate for 

extensive cardiac workup as per cardiology note.  Continue beta-blockers, Ranexa

, statins and ACE.





(5) Pneumonia


Community-acquired.  Sputum culture positive for strep pneumo pansensitive.  

Continue Rocephin and DC'd azithromycin.  Metronidazole was added for possible 

aspiration pneumonia after she had an emesis while intubated.  Switch to oral 

antibiotics once patient is extubated and able to take p.o. medication. 





(6) CHF (congestive heart failure)


Systolic dysfunction.  2D echo shows ejection fraction of 30-35%.  Continue 

Lasix guided by her vitals.  Monitor volume status.


Pleural effusion was noted on initial CT.  Chest ultrasound ordered by 

pulmonary only showed minimal pleural fluid.





(7) Afib


Rate controlled.  Patient was restarted on Cardizem drip this morning.  As per 

daughter patient is not on chronic anticoagulation due to GI bleeding caused by 

anticoagulation treatment in the past.





(8) Constipation


Patient has history of chronic constipation.  A CT abdomen was done which was 

indicative of partial obstruction or pseudoobstruction surgery was consulted 

for recommendation and they stated that at this is not an obstruction possibly 

be ileus or constipation.  Continue monitoring her if does not improve then may 

consider compressive colonoscopy .





(9) Abdominal distention


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  Patient has history of obstruction (status 

post prior colectomy and reversal of ileostomy) continue NG tube with 

intermittent suction.  Surgery following.  Daily KUB.  Per surgery if bowel 

distention gets more 10 cm they will consider a compressive colonoscopy.  

Several attempts were made to get a GI consult for possible intervention 

unfortunately they were unsuccessful. 





Physical Exam


Vital Signs: 


 











Temp Pulse Resp BP Pulse Ox


 


 98.2 F   90   14   91/66 L  100 


 


 10/20/18 08:00  10/20/18 08:13  10/20/18 08:13  10/20/18 08:00  10/20/18 08:13








 Intake & Output











 10/19/18 10/20/18 10/21/18





 06:59 06:59 06:59


 


Intake Total 1922 1463 44


 


Output Total 1055 755 75


 


Balance 867 708 -31


 


Weight 58.8 kg 59.4 kg 











General appearance: PRESENT: no acute distress, well-developed, well-nourished


Respiratory exam: PRESENT: clear to auscultation constantine, other - Intubated.  ABSENT

: rales, rhonchi, wheezes


Cardiovascular exam: PRESENT: RRR.  ABSENT: diastolic murmur, rubs, systolic 

murmur


GI/Abdominal exam: PRESENT: distended, guarding, hypoactive bowel sounds, soft.

  ABSENT: mass, organolmegaly, rebound, tenderness


Extremities exam: PRESENT: full ROM.  ABSENT: calf tenderness, clubbing, pedal 

edema


Neurological exam: PRESENT: alert, awake, CN II-XII grossly intact - Intubated





Results


Laboratory Results: 


 





 10/20/18 05:22 





 10/20/18 05:22 





 











  10/20/18 10/20/18 10/20/18





  05:22 05:22 05:30


 


WBC  7.7  


 


RBC  3.45 L  


 


Hgb  10.7 L  


 


Hct  30.4 L  


 


MCV  88  


 


MCH  30.9  


 


MCHC  35.0  


 


RDW  14.3 H  


 


Plt Count  162  


 


Seg Neutrophils %  77.9  


 


Lymphocytes %  12.4 L  


 


Monocytes %  9.6  


 


Eosinophils %  0.1  


 


Basophils %  0.0  


 


Absolute Neutrophils  6.0  


 


Absolute Lymphocytes  1.0  


 


Absolute Monocytes  0.7  


 


Absolute Eosinophils  0.0  


 


Absolute Basophils  0.0  


 


Carbonic Acid    0.91 L


 


HCO3/H2CO3 Ratio    21:1


 


ABG pH    7.44


 


ABG pCO2    30.3 L


 


ABG pO2    79.5 L


 


ABG HCO3    20.0


 


ABG O2 Saturation    96.3


 


ABG Base Excess    -3.3


 


FiO2    40%


 


Sodium   139.1 


 


Potassium   3.4 L 


 


Chloride   109 H 


 


Carbon Dioxide   21 L 


 


Anion Gap   9 


 


BUN   26 H 


 


Creatinine   0.85 


 


Est GFR ( Amer)   > 60 


 


Est GFR (Non-Af Amer)   > 60 


 


Glucose   75 


 


Calcium   8.2 L 


 


Magnesium   2.0 


 


Total Bilirubin   0.4 


 


AST   21 


 


ALT   44 


 


Alkaline Phosphatase   37 L 


 


Total Protein   4.6 L 


 


Albumin   2.6 L 








 











  10/15/18





  13:35


 


Troponin I  0.224











Impressions: 


 





Chest CT  10/14/18 10:12


IMPRESSION:


1. CHF.  Cannot exclude superimposed pneumonia in the middle lobe.


2. Stable ascending thoracic aortic aneurysm.


 








Head CT  10/14/18 10:12


IMPRESSION:  CHRONIC CHANGES OF ATROPHY AND MICROVASCULAR ISCHEMIA.  NO ACUTE 

PROCESS.


EVIDENCE OF ACUTE STROKE: NO.


 








Chest Ultrasound  10/15/18 09:05


IMPRESSION:  Trace left pleural fluid is present in the posterior costophrenic 

sulcus.  No thoracentesis was performed.


 








Abdomen/Pelvis CT  10/17/18 00:00


IMPRESSION:  Partial colonic obstruction versus pseudo-obstruction Thu's 

syndrome.  No high-grade obstruction.  No fecal impaction.


 








KUB X-Ray  10/18/18 06:00


IMPRESSION:  Abnormal but nonspecific bowel gas pattern with gas distention of 

stomach out of proportion to the remainder of the gastrointestinal tract.


 








Abdomen X-Ray  10/20/18 06:00


IMPRESSION:


 


1. No significant change when compared to the prior study.


2. Nonspecific and nonobstructive bowel gas pattern.


3. Nasogastric tube tip projects over the region of the stomach.


4. Vascular calcifications.


 








Chest X-Ray  10/20/18 06:00


IMPRESSION:


 


1. No significant change when compared to the prior study.


2. Persistent bibasilar volume loss greater on the left as


described above.


3. Grossly stable life support lines and tubes.


 














Qualifiers





- *


PATIENT BEING DISCHARGED WITH ANY OF THE FOLLOWING DIAGNOSIS: No


VTE patient discharged on overlapping Therapy?: Yes

## 2018-10-20 NOTE — RADIOLOGY REPORT (SQ)
EXAM DESCRIPTION: X-ray single view chest.



CLINICAL HISTORY: 85 years Female, resp failure



COMPARISON: Prior portable chest performed on 10/19/2018



TECHNIQUE: Single portable view of the chest performed on

10/20/2018 at 6:10 AM



FINDINGS: 

The lungs are well expanded. There is persistent volume loss in

the lung bases greater on the left likely due to a combination of

pleural fluid and atelectasis. Underlying inflammatory changes

are not excluded. There is no evidence of a pneumothorax.



The cardiac silhouette is grossly stable. There are

atherosclerotic calcifications along the thoracic aorta.



The mediastinal contours are normal.



No acute osseous abnormality is identified.



No focal soft tissue abnormalities are seen.



Lines and tubes:  The endotracheal tube, feeding tube and left

sided central venous catheter are grossly stable in position.



IMPRESSION:



1. No significant change when compared to the prior study.

2. Persistent bibasilar volume loss greater on the left as

described above.

3. Grossly stable life support lines and tubes.

## 2018-10-20 NOTE — PDOC PROGRESS REPORT
Subjective


Progress Note for:: 10/20/18


Subjective:: 





intubated


Reason For Visit: 


ACUTE RESPIRATORY FAILURE








Physical Exam


Vital Signs: 


 











Temp Pulse Resp BP Pulse Ox


 


 98.2 F   90   14   91/66 L  100 


 


 10/20/18 08:00  10/20/18 08:13  10/20/18 08:13  10/20/18 08:00  10/20/18 08:13








 Intake & Output











 10/19/18 10/20/18 10/21/18





 06:59 06:59 06:59


 


Intake Total 1922 1463 44


 


Output Total 1055 755 75


 


Balance 867 708 -31


 


Weight 58.8 kg 59.4 kg 











General appearance: PRESENT: other - sedated


GI/Abdominal exam: PRESENT: distended, soft





Results


Laboratory Results: 


 





 10/20/18 05:22 





 10/20/18 05:22 





 











  10/20/18 10/20/18 10/20/18





  05:22 05:22 05:30


 


WBC  7.7  


 


RBC  3.45 L  


 


Hgb  10.7 L  


 


Hct  30.4 L  


 


MCV  88  


 


MCH  30.9  


 


MCHC  35.0  


 


RDW  14.3 H  


 


Plt Count  162  


 


Seg Neutrophils %  77.9  


 


Lymphocytes %  12.4 L  


 


Monocytes %  9.6  


 


Eosinophils %  0.1  


 


Basophils %  0.0  


 


Absolute Neutrophils  6.0  


 


Absolute Lymphocytes  1.0  


 


Absolute Monocytes  0.7  


 


Absolute Eosinophils  0.0  


 


Absolute Basophils  0.0  


 


Carbonic Acid    0.91 L


 


HCO3/H2CO3 Ratio    21:1


 


ABG pH    7.44


 


ABG pCO2    30.3 L


 


ABG pO2    79.5 L


 


ABG HCO3    20.0


 


ABG O2 Saturation    96.3


 


ABG Base Excess    -3.3


 


FiO2    40%


 


Sodium   139.1 


 


Potassium   3.4 L 


 


Chloride   109 H 


 


Carbon Dioxide   21 L 


 


Anion Gap   9 


 


BUN   26 H 


 


Creatinine   0.85 


 


Est GFR ( Amer)   > 60 


 


Est GFR (Non-Af Amer)   > 60 


 


Glucose   75 


 


Calcium   8.2 L 


 


Magnesium   2.0 


 


Total Bilirubin   0.4 


 


AST   21 


 


ALT   44 


 


Alkaline Phosphatase   37 L 


 


Total Protein   4.6 L 


 


Albumin   2.6 L 








 











  10/15/18





  13:35


 


Troponin I  0.224











Impressions: 


 





Chest CT  10/14/18 10:12


IMPRESSION:


1. CHF.  Cannot exclude superimposed pneumonia in the middle lobe.


2. Stable ascending thoracic aortic aneurysm.


 








Head CT  10/14/18 10:12


IMPRESSION:  CHRONIC CHANGES OF ATROPHY AND MICROVASCULAR ISCHEMIA.  NO ACUTE 

PROCESS.


EVIDENCE OF ACUTE STROKE: NO.


 








Chest Ultrasound  10/15/18 09:05


IMPRESSION:  Trace left pleural fluid is present in the posterior costophrenic 

sulcus.  No thoracentesis was performed.


 








Abdomen/Pelvis CT  10/17/18 00:00


IMPRESSION:  Partial colonic obstruction versus pseudo-obstruction Thu's 

syndrome.  No high-grade obstruction.  No fecal impaction.


 








KUB X-Ray  10/18/18 06:00


IMPRESSION:  Abnormal but nonspecific bowel gas pattern with gas distention of 

stomach out of proportion to the remainder of the gastrointestinal tract.


 








Abdomen X-Ray  10/20/18 06:00


IMPRESSION:


 


1. No significant change when compared to the prior study.


2. Nonspecific and nonobstructive bowel gas pattern.


3. Nasogastric tube tip projects over the region of the stomach.


4. Vascular calcifications.


 








Chest X-Ray  10/20/18 06:00


IMPRESSION:


 


1. No significant change when compared to the prior study.


2. Persistent bibasilar volume loss greater on the left as


described above.


3. Grossly stable life support lines and tubes.


 














Assessment & Plan





- Diagnosis


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


Is this a current diagnosis for this admission?: Yes   





- Plan Summary


Plan Summary: 





A/





[place]'s syndrome.





Patient to go on [facility] are today





I will sign off.





Thanks

## 2021-05-12 NOTE — PDOC PROGRESS REPORT
Subjective


Progress Note for:: 10/19/18


Subjective:: 


Going to be going to be past medical history of COPD, hypertension, A. fib (not 

on anticoagulation), PAD status post 2 femoral stent placement in the past, GI 

bleed, bowel obstruction (status post prior colectomy and reversal of ileostomy)

, CVA (right temporal ischemic stroke), REYNALDO not compliant with CPAP and CAD. 





10/16/2018.  She was admitted on 10/15/2018 for progressively worsening 

shortness of breath.  On arrival she was obtunded and was intubated for airway 

protection.  VBG in ER showed pH of 7.09 and PCO2 of 75.  CT chest on admission 

showed congestion bilateral pleural effusion and possible right middle lobe 

pneumonia. Patient was noted to be in A. fib with RVR and was started on 

Cardizem drip.





10/17/2018.  Acute events overnight.  On my encounter patient was sleeping but 

arousable.  Patient is not intubated but able to communicate by shaking her 

head.  When asked if she is in pain she is a firm and by shaking her head.  She 

is able to follow command.  When asked if she is hurting in her abdomen she 

signaling yes. 





10/18/2018.  Yesterday she was noted to have an abdominal distention.  A KUB 

was done which was nonconclusive and CT abdomen was followed,which showed 

possible bowel obstruction.  Surgery was consulted and they said this may not 

be obstruction could possibly be an ileus.  The recommendation was to continue 

monitoring the patient and if bowel distention becomes more than 10 cm they 

will do compressive colonoscopy. Today in the morning as per charge nurse 

patient had one episode of emesis around her ET tube when NG tube was clamped.  

Patient has been less active more lethargic today.  However patient still opens 

her eyes and follows commands. Several attempt was made today to contact any GI 

specialist for possible intervention unfortunately we were not able to get hold 

of anybody.  Surgery has been following and I still think that this could be 

due to ileus and constipation.  For now they will be following daily and 

recommending daily KUBs.





10/19/2018.  No acute events overnight.  Still intubated sleepy but arousable.  

She does not seem to be in any acute distress.  Patient follows commands and 

answers questions by shaking her head.  Abdomen is not distended and patient 

has not had any bowel movement since yesterday.  Family was updated about 

patient's medical status patient.  I had a discussion with daughter who has a 

power of  and his her son.  They are having a family discussion for 

possible transition of care to comfort measures only.  Discharge planner has 

been consulted and they are in touch with the family.


Reason For Visit: 


ACUTE RESPIRATORY FAILURE








Physical Exam


Vital Signs: 


 











Temp Pulse Resp BP Pulse Ox


 


 98.8 F   81   14   123/54 L  94 


 


 10/19/18 12:00  10/19/18 12:00  10/19/18 12:00  10/19/18 12:00  10/19/18 12:00








 Intake & Output











 10/18/18 10/19/18 10/20/18





 06:59 06:59 06:59


 


Intake Total 1233 1922 


 


Output Total 1095 1055 195


 


Balance 138 867 -195


 


Weight 57.5 kg 58.8 kg 











General appearance: PRESENT: no acute distress


Head exam: PRESENT: atraumatic, normocephalic


Respiratory exam: PRESENT: decreased breath sounds.  ABSENT: rales, rhonchi, 

wheezes


Cardiovascular exam: PRESENT: RRR.  ABSENT: diastolic murmur, rubs, systolic 

murmur


GI/Abdominal exam: PRESENT: distended, hypoactive bowel sounds, soft.  ABSENT: 

guarding, mass, organolmegaly, rebound, tenderness


Extremities exam: PRESENT: full ROM.  ABSENT: calf tenderness, clubbing, pedal 

edema





Results


Laboratory Results: 


 





 10/19/18 05:20 





 10/19/18 05:20 





 











  10/19/18 10/19/18 10/19/18





  05:20 05:20 05:20


 


WBC    7.0


 


RBC    3.37 L


 


Hgb    10.4 L


 


Hct    30.0 L


 


MCV    89


 


MCH    30.8


 


MCHC    34.6


 


RDW    14.0


 


Plt Count    166


 


Seg Neutrophils %    82.0 H


 


Lymphocytes %    8.3 L


 


Monocytes %    9.6


 


Eosinophils %    0.0


 


Basophils %    0.1


 


Absolute Neutrophils    5.8


 


Absolute Lymphocytes    0.6


 


Absolute Monocytes    0.7


 


Absolute Eosinophils    0.0


 


Absolute Basophils    0.0


 


Carbonic Acid  0.94 L  


 


HCO3/H2CO3 Ratio  23:1  


 


ABG pH  7.46 H  


 


ABG pCO2  31.3 L  


 


ABG pO2  87.8  


 


ABG HCO3  21.8  


 


ABG O2 Saturation  97.2  


 


ABG Base Excess  -1.4  


 


FiO2  40%  


 


Sodium   139.1 


 


Potassium   3.6 


 


Chloride   109 H 


 


Carbon Dioxide   23 


 


Anion Gap   7 


 


BUN   25 H 


 


Creatinine   0.90 


 


Est GFR ( Amer)   > 60 


 


Est GFR (Non-Af Amer)   > 60 


 


Glucose   87 


 


Calcium   8.2 L 


 


Magnesium   2.2 








 











  10/15/18





  13:35


 


Troponin I  0.224











Impressions: 


 





Chest CT  10/14/18 10:12


IMPRESSION:


1. CHF.  Cannot exclude superimposed pneumonia in the middle lobe.


2. Stable ascending thoracic aortic aneurysm.


 








Head CT  10/14/18 10:12


IMPRESSION:  CHRONIC CHANGES OF ATROPHY AND MICROVASCULAR ISCHEMIA.  NO ACUTE 

PROCESS.


EVIDENCE OF ACUTE STROKE: NO.


 








Chest Ultrasound  10/15/18 09:05


IMPRESSION:  Trace left pleural fluid is present in the posterior costophrenic 

sulcus.  No thoracentesis was performed.


 








Abdomen/Pelvis CT  10/17/18 00:00


IMPRESSION:  Partial colonic obstruction versus pseudo-obstruction [place]'s 

syndrome.  No high-grade obstruction.  No fecal impaction.


 








KUB X-Ray  10/18/18 06:00


IMPRESSION:  Abnormal but nonspecific bowel gas pattern with gas distention of 

stomach out of proportion to the remainder of the gastrointestinal tract.


 








Abdomen X-Ray  10/19/18 06:00


IMPRESSION:


 


1. No significant change when compared to the prior study.


2. Nonspecific and nonobstructive bowel gas pattern.


3. Other chronic findings as described above.


 








Chest X-Ray  10/19/18 06:00


IMPRESSION: 


 


No change.


 














Assessment & Plan





- Diagnosis


(1) Acute hypercapnic respiratory failure


Is this a current diagnosis for this admission?: Yes   


Plan: 


Unchanged.  ABG improving.  Chest x-ray unchanged.  Intubated. 


Possibly COPD exacerbation combined with pneumonia and CHF exacerbation. BNP on 

admission 9440.  White blood cells within normal limits. Sputum culture 

positive for strep pneumo pansensitive.  Continue Rocephin IV.  Trended is on 

IV was added for possible aspiration due to emesis 10/18/2018. Switch to p.o. 

medication when patient is extubated.








(2) COPD exacerbation


Is this a current diagnosis for this admission?: Yes   


Plan: 


Currently intubated.  Solu-Medrol was decreased to 40 mg daily.








(3) Do not resuscitate


Is this a current diagnosis for this admission?: Yes   


Plan: 


Patient is DNR as per family's request.  Family is discussing possible 

transition of care to [facility] and home hospice.  As per previous attendings note 

patient has refused any intervention for her AAA in the past.  Patient's family 

has opted for no chest compression if she codes








(4) NSTEMI (non-ST elevated myocardial infarction)


Is this a current diagnosis for this admission?: Yes   


Plan: 


Elevated troponins.  As per cardiology note likely due to her metabolic reason 

versus ACS.  Continue anticoagulation.  Patient not a candidate for extensive 

cardiac workup as per cardiology note.  Continue beta-blockers, Ranexa, statins 

and ACE.








(5) Pneumonia


Qualifiers: 


   Pneumonia type: due to unspecified organism   Laterality: bilateral   Lung 

location: unspecified part of lung   Qualified Code(s): J18.9 - Pneumonia, 

unspecified organism   


Is this a current diagnosis for this admission?: Yes   


Plan: 


Most likely community-acquired.  Sputum culture positive for strep pneumo 

pansensitive.  Continue Rocephin DC azithromycin.  Metronidazole was added for 

possible aspiration pneumonia.  Switch to oral antibiotics once patient is 

extubated and able to take p.o. medication. 








(6) CHF (congestive heart failure)


Qualifiers: 


   Heart failure type: combined systolic and diastolic   Heart failure 

chronicity: acute   Qualified Code(s): I50.41 - Acute combined systolic (

congestive) and diastolic (congestive) heart failure   


Is this a current diagnosis for this admission?: Yes   


Plan: 


Systolic dysfunction.  2D echo shows ejection fraction of 30-35%.  Continue 

Lasix guided by her vitals.  Monitor volume status.





Pleural effusion was noted on initial CT.  Chest ultrasound ordered by 

pulmonary only showed minimal pleural fluid.








(7) Afib


Is this a current diagnosis for this admission?: No   


Plan: 


Rate controlled.  Patient was restarted on Cardizem drip this morning.  As per 

daughter patient is not on chronic anticoagulation due to GI bleeding caused by 

anticoagulation treatment in the past.








(8) Constipation


Is this a current diagnosis for this admission?: Yes   


Plan: 


Patient has history of chronic constipation.  A CT abdomen was done which was 

indicative of partial obstruction or pseudoobstruction surgery was consulted 

for recommendation and they stated that at this is not an obstruction possibly 

be ileus or constipation.  Continue monitoring her if does not improve then may 

consider compressive colonoscopy .








(9) Abdominal distention


Is this a current diagnosis for this admission?: Yes   


Plan: 


Likely due to ileus and constipation.  Patient has history of obstruction (

status post prior colectomy and reversal of ileostomy) continue NG tube with 

intermittent suction.  Surgery following.  Daily KUB.  Per surgery if bowel 

distention gets more 10 cm they will consider a compressive colonoscopy.  

Several attempts were made to get a GI consult for possible intervention 

unfortunately they were unsuccessful. Detail Level: Zone Render Risk Assessment In Note?: no Note Text (......Xxx Chief Complaint.): This diagnosis correlates with the Other (Free Text): Will see what path shows and treat lesion appropriately in July at f/u visit per Dr. Ramirez